# Patient Record
Sex: MALE | Race: WHITE | NOT HISPANIC OR LATINO | Employment: OTHER | URBAN - METROPOLITAN AREA
[De-identification: names, ages, dates, MRNs, and addresses within clinical notes are randomized per-mention and may not be internally consistent; named-entity substitution may affect disease eponyms.]

---

## 2020-05-20 ENCOUNTER — TELEPHONE (OUTPATIENT)
Dept: INTERNAL MEDICINE | Facility: CLINIC | Age: 65
End: 2020-05-20

## 2020-08-11 ENCOUNTER — OFFICE VISIT (OUTPATIENT)
Dept: INTERNAL MEDICINE | Facility: CLINIC | Age: 65
End: 2020-08-11
Payer: MEDICARE

## 2020-08-11 VITALS
OXYGEN SATURATION: 100 % | HEIGHT: 73 IN | BODY MASS INDEX: 23.66 KG/M2 | DIASTOLIC BLOOD PRESSURE: 80 MMHG | HEART RATE: 96 BPM | WEIGHT: 178.56 LBS | SYSTOLIC BLOOD PRESSURE: 125 MMHG

## 2020-08-11 DIAGNOSIS — E78.5 HYPERLIPIDEMIA, UNSPECIFIED HYPERLIPIDEMIA TYPE: ICD-10-CM

## 2020-08-11 DIAGNOSIS — Z00.00 ANNUAL PHYSICAL EXAM: Primary | ICD-10-CM

## 2020-08-11 PROCEDURE — 99387 INIT PM E/M NEW PAT 65+ YRS: CPT | Mod: HCNC,S$GLB,, | Performed by: INTERNAL MEDICINE

## 2020-08-11 PROCEDURE — 3079F DIAST BP 80-89 MM HG: CPT | Mod: HCNC,CPTII,S$GLB, | Performed by: INTERNAL MEDICINE

## 2020-08-11 PROCEDURE — 3079F PR MOST RECENT DIASTOLIC BLOOD PRESSURE 80-89 MM HG: ICD-10-PCS | Mod: HCNC,CPTII,S$GLB, | Performed by: INTERNAL MEDICINE

## 2020-08-11 PROCEDURE — 99999 PR PBB SHADOW E&M-EST. PATIENT-LVL III: CPT | Mod: PBBFAC,HCNC,, | Performed by: INTERNAL MEDICINE

## 2020-08-11 PROCEDURE — 99499 RISK ADDL DX/OHS AUDIT: ICD-10-PCS | Mod: S$GLB,,, | Performed by: INTERNAL MEDICINE

## 2020-08-11 PROCEDURE — 99387 PR PREVENTIVE VISIT,NEW,65 & OVER: ICD-10-PCS | Mod: HCNC,S$GLB,, | Performed by: INTERNAL MEDICINE

## 2020-08-11 PROCEDURE — 3074F PR MOST RECENT SYSTOLIC BLOOD PRESSURE < 130 MM HG: ICD-10-PCS | Mod: HCNC,CPTII,S$GLB, | Performed by: INTERNAL MEDICINE

## 2020-08-11 PROCEDURE — 99999 PR PBB SHADOW E&M-EST. PATIENT-LVL III: ICD-10-PCS | Mod: PBBFAC,HCNC,, | Performed by: INTERNAL MEDICINE

## 2020-08-11 PROCEDURE — 3074F SYST BP LT 130 MM HG: CPT | Mod: HCNC,CPTII,S$GLB, | Performed by: INTERNAL MEDICINE

## 2020-08-11 PROCEDURE — 99499 UNLISTED E&M SERVICE: CPT | Mod: S$GLB,,, | Performed by: INTERNAL MEDICINE

## 2020-08-11 RX ORDER — ATORVASTATIN CALCIUM 40 MG/1
40 TABLET, FILM COATED ORAL DAILY
COMMUNITY
End: 2020-08-11 | Stop reason: SDUPTHER

## 2020-08-11 RX ORDER — ATORVASTATIN CALCIUM 40 MG/1
40 TABLET, FILM COATED ORAL DAILY
Qty: 90 TABLET | Refills: 4 | Status: SHIPPED | OUTPATIENT
Start: 2020-08-11 | End: 2021-08-18 | Stop reason: SDUPTHER

## 2020-08-11 NOTE — PROGRESS NOTES
Subjective:       Patient ID: Fausto Aldana is a 65 y.o. male.    Chief Complaint: Establish Care and Annual Exam   This is a 65-year-old who presents today for physical to establish she reports that was previoudly in the  and continues his VA benefits he does [facility] the [facility] and he does that in conjunction with the regular physical in August he [facility] ago and he reports his checkup was okay at that time.  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  Patient reports that in general he is a healthy person he exercises 10,000 steps a day he denies any chest pain or shortness of breath.  He follows with his outlying ophthalmologist Dr. Bobo and has an upcoming appointment.   he reports [facility] which he [facility] with [facility] doctor reports it was good he had a hemoglobin A1c that was 5.1 and his cholesterol was 169 with an LDL of 104 .  He has his blood work on his phone and will try to upload a copy for his portal as well he did not wish to repeat his blood work at this time he reports his PSA was normal and has been stable over time at 2.2. He was placed on cholesterol medicine years ago to keep his cholesterol down and has been taking it regularly.  He gets his medications from [facility] and would like a refill sent.  Patient reports he is up-to-date on all his immunizations he got them previously will try to bring a copy including on Shingrix vaccine and the and Prevnar and Pneumovax.  He had a nomral colonoscopy 7 years ago at a  facility out of town and reports will be doing a [facility] with [facility] the [facility] that he got yesterday is as well    HPI  Review of Systems   Constitutional: Negative for activity change and unexpected weight change.        Exercisess regulary 10,000 steps daily    HENT: Negative for hearing loss, rhinorrhea and  "trouble swallowing.    Eyes: Negative for discharge and visual disturbance.   Respiratory: Negative for chest tightness and wheezing.    Cardiovascular: Negative for chest pain and palpitations.   Gastrointestinal: Negative for blood in stool, constipation, diarrhea and vomiting.   Endocrine: Negative for polydipsia and polyuria.   Genitourinary: Negative for difficulty urinating, hematuria and urgency.   Musculoskeletal: Negative for arthralgias, joint swelling and neck pain.   Neurological: Negative for weakness and headaches.   Psychiatric/Behavioral: Negative for confusion and dysphoric mood.       Objective:     Blood pressure 125/80, pulse 96, height 6' 1" (1.854 m), weight 81 kg (178 lb 9.2 oz), SpO2 100 %.    Physical Exam  Constitutional:       General: He is not in acute distress.  HENT:      Head: Normocephalic.   Eyes:      General: No scleral icterus.  Neck:      Musculoskeletal: Neck supple.   Cardiovascular:      Rate and Rhythm: Normal rate and regular rhythm.      Heart sounds: Normal heart sounds. No murmur. No friction rub. No gallop.    Pulmonary:      Effort: Pulmonary effort is normal. No respiratory distress.      Breath sounds: Normal breath sounds.   Abdominal:      General: Bowel sounds are normal.      Palpations: Abdomen is soft. There is no mass.      Tenderness: There is no abdominal tenderness.   Genitourinary:     Prostate: Normal.   Skin:     Findings: No erythema.      Comments: seb keratosis some moles    Neurological:      Mental Status: He is alert.         Assessment:       1. Annual physical exam    2. Hyperlipidemia, unspecified hyperlipidemia type        Plan:       Fausto was seen today for establish care and annual exam.    Diagnoses and all orders for this visit:    Annual physical exam    Hyperlipidemia, unspecified hyperlipidemia type  History of patient has been tolerating atorvastatin without difficulty recent labs acceptable and total cholesterol was 169 he will tried " upload is copy of all of his blood work when he gets home to scan into the chart.  -     atorvastatin (LIPITOR) 40 MG tablet; Take 1 tablet (40 mg total) by mouth once daily.    Patient reports he is up-to-date on all his vaccines including Shingrix and pneumonia vaccines.    Did discussed consider Dermatology appointment he has an outlying dermatologist he plans to see he also follows with Ms. gracielaying ophthalmologist and has an upcoming appointment planned.    Chol 169, hdl 47 ldl 104 trih/ h/h 16.5/ 47 is4 platelet is. 178 hgaic 5.1     Continue regular exercise  Patient reports up-to-date on his colonoscopy and has a fit kit he plans to [facility] the [facility]    Follow-up annually sooner if concerns he will upload her bring his blood work as discussed        Answers for HPI/ROS submitted by the patient on 8/8/2020   activity change: No  unexpected weight change: No  neck pain: No  hearing loss: No  rhinorrhea: No  trouble swallowing: No  eye discharge: No  visual disturbance: No  chest tightness: No  wheezing: No  chest pain: No  palpitations: No  blood in stool: No  constipation: No  vomiting: No  diarrhea: No  polydipsia: No  polyuria: No  difficulty urinating: No  urgency: No  hematuria: No  joint swelling: No  arthralgias: No  headaches: No  weakness: No  confusion: No  dysphoric mood: No

## 2020-09-01 ENCOUNTER — TELEPHONE (OUTPATIENT)
Dept: INTERNAL MEDICINE | Facility: CLINIC | Age: 65
End: 2020-09-01

## 2020-09-01 RX ORDER — SILDENAFIL 100 MG/1
TABLET, FILM COATED ORAL
Qty: 30 TABLET | Refills: 3 | Status: SHIPPED | OUTPATIENT
Start: 2020-09-01 | End: 2021-08-18 | Stop reason: SDUPTHER

## 2020-09-01 NOTE — TELEPHONE ENCOUNTER
Called and spoke with pt  Request rx for generic viagra  [facility]  But needs sent to local pharmacy  Request sent to ankur guzman  rx sent as requested

## 2020-09-01 NOTE — TELEPHONE ENCOUNTER
----- Message from Lili Felder sent at 9/1/2020  4:07 PM CDT -----  Contact: Pt [phone]--  Rx Refill/Request     Is this a New Rx:--Yes--    Rx Name and Strength:    1.Sildenafil     Preferred Pharm[facility] with phone number:--[phone]  [phone] [id number]    Communication Preference:--Fausto--[phone]--    Additional Information: Pt calling to speak with the nurse to see if the doctors would be able to send the medication listed above. Please call to advise.

## 2021-01-24 ENCOUNTER — PATIENT MESSAGE (OUTPATIENT)
Dept: INTERNAL MEDICINE | Facility: CLINIC | Age: 66
End: 2021-01-24

## 2021-01-27 ENCOUNTER — PATIENT MESSAGE (OUTPATIENT)
Dept: INTERNAL MEDICINE | Facility: CLINIC | Age: 66
End: 2021-01-27

## 2021-01-27 RX ORDER — ONDANSETRON 4 MG/1
4 TABLET, ORALLY DISINTEGRATING ORAL EVERY 8 HOURS PRN
Qty: 10 TABLET | Refills: 0 | Status: SHIPPED | OUTPATIENT
Start: 2021-01-27 | End: 2021-08-18

## 2021-03-07 ENCOUNTER — HOSPITAL ENCOUNTER (EMERGENCY)
Facility: HOSPITAL | Age: 66
Discharge: HOME | End: 2021-03-07
Attending: EMERGENCY MEDICINE
Payer: MEDICARE

## 2021-03-07 VITALS
TEMPERATURE: 98 F | WEIGHT: 181 LBS | RESPIRATION RATE: 18 BRPM | DIASTOLIC BLOOD PRESSURE: 79 MMHG | BODY MASS INDEX: 23.88 KG/M2 | HEART RATE: 88 BPM | OXYGEN SATURATION: 98 % | SYSTOLIC BLOOD PRESSURE: 144 MMHG

## 2021-03-07 DIAGNOSIS — G56.00 MEDIAN NERVE COMPRESSION: Primary | ICD-10-CM

## 2021-03-07 PROCEDURE — 99282 PR EMERGENCY DEPT VISIT,LEVEL II: ICD-10-PCS | Mod: ,,, | Performed by: EMERGENCY MEDICINE

## 2021-03-07 PROCEDURE — 99281 EMR DPT VST MAYX REQ PHY/QHP: CPT

## 2021-03-07 PROCEDURE — 99282 EMERGENCY DEPT VISIT SF MDM: CPT | Mod: ,,, | Performed by: EMERGENCY MEDICINE

## 2021-07-12 ENCOUNTER — PATIENT MESSAGE (OUTPATIENT)
Dept: INTERNAL MEDICINE | Facility: CLINIC | Age: 66
End: 2021-07-12

## 2021-08-18 ENCOUNTER — OFFICE VISIT (OUTPATIENT)
Dept: INTERNAL MEDICINE | Facility: CLINIC | Age: 66
End: 2021-08-18
Payer: MEDICARE

## 2021-08-18 ENCOUNTER — PATIENT MESSAGE (OUTPATIENT)
Dept: INTERNAL MEDICINE | Facility: CLINIC | Age: 66
End: 2021-08-18

## 2021-08-18 VITALS
DIASTOLIC BLOOD PRESSURE: 72 MMHG | HEART RATE: 81 BPM | HEIGHT: 73 IN | BODY MASS INDEX: 22.12 KG/M2 | OXYGEN SATURATION: 97 % | SYSTOLIC BLOOD PRESSURE: 108 MMHG | WEIGHT: 166.88 LBS

## 2021-08-18 DIAGNOSIS — E78.5 HYPERLIPIDEMIA, UNSPECIFIED HYPERLIPIDEMIA TYPE: ICD-10-CM

## 2021-08-18 DIAGNOSIS — Z00.00 ANNUAL PHYSICAL EXAM: Primary | ICD-10-CM

## 2021-08-18 PROCEDURE — 3074F SYST BP LT 130 MM HG: CPT | Mod: CPTII,S$GLB,, | Performed by: INTERNAL MEDICINE

## 2021-08-18 PROCEDURE — 3288F PR FALLS RISK ASSESSMENT DOCUMENTED: ICD-10-PCS | Mod: CPTII,S$GLB,, | Performed by: INTERNAL MEDICINE

## 2021-08-18 PROCEDURE — 99499 RISK ADDL DX/OHS AUDIT: ICD-10-PCS | Mod: S$GLB,,, | Performed by: INTERNAL MEDICINE

## 2021-08-18 PROCEDURE — 1159F PR MEDICATION LIST DOCUMENTED IN MEDICAL RECORD: ICD-10-PCS | Mod: CPTII,S$GLB,, | Performed by: INTERNAL MEDICINE

## 2021-08-18 PROCEDURE — 99397 PER PM REEVAL EST PAT 65+ YR: CPT | Mod: S$GLB,,, | Performed by: INTERNAL MEDICINE

## 2021-08-18 PROCEDURE — 99999 PR PBB SHADOW E&M-EST. PATIENT-LVL III: ICD-10-PCS | Mod: PBBFAC,,, | Performed by: INTERNAL MEDICINE

## 2021-08-18 PROCEDURE — 99999 PR PBB SHADOW E&M-EST. PATIENT-LVL III: CPT | Mod: PBBFAC,,, | Performed by: INTERNAL MEDICINE

## 2021-08-18 PROCEDURE — 1159F MED LIST DOCD IN RCRD: CPT | Mod: CPTII,S$GLB,, | Performed by: INTERNAL MEDICINE

## 2021-08-18 PROCEDURE — 1101F PT FALLS ASSESS-DOCD LE1/YR: CPT | Mod: CPTII,S$GLB,, | Performed by: INTERNAL MEDICINE

## 2021-08-18 PROCEDURE — 99397 PR PREVENTIVE VISIT,EST,65 & OVER: ICD-10-PCS | Mod: S$GLB,,, | Performed by: INTERNAL MEDICINE

## 2021-08-18 PROCEDURE — 3074F PR MOST RECENT SYSTOLIC BLOOD PRESSURE < 130 MM HG: ICD-10-PCS | Mod: CPTII,S$GLB,, | Performed by: INTERNAL MEDICINE

## 2021-08-18 PROCEDURE — 3008F PR BODY MASS INDEX (BMI) DOCUMENTED: ICD-10-PCS | Mod: CPTII,S$GLB,, | Performed by: INTERNAL MEDICINE

## 2021-08-18 PROCEDURE — 99499 UNLISTED E&M SERVICE: CPT | Mod: S$GLB,,, | Performed by: INTERNAL MEDICINE

## 2021-08-18 PROCEDURE — 3008F BODY MASS INDEX DOCD: CPT | Mod: CPTII,S$GLB,, | Performed by: INTERNAL MEDICINE

## 2021-08-18 PROCEDURE — 3078F PR MOST RECENT DIASTOLIC BLOOD PRESSURE < 80 MM HG: ICD-10-PCS | Mod: CPTII,S$GLB,, | Performed by: INTERNAL MEDICINE

## 2021-08-18 PROCEDURE — 3288F FALL RISK ASSESSMENT DOCD: CPT | Mod: CPTII,S$GLB,, | Performed by: INTERNAL MEDICINE

## 2021-08-18 PROCEDURE — 1126F AMNT PAIN NOTED NONE PRSNT: CPT | Mod: CPTII,S$GLB,, | Performed by: INTERNAL MEDICINE

## 2021-08-18 PROCEDURE — 3078F DIAST BP <80 MM HG: CPT | Mod: CPTII,S$GLB,, | Performed by: INTERNAL MEDICINE

## 2021-08-18 PROCEDURE — 1160F PR REVIEW ALL MEDS BY PRESCRIBER/CLIN PHARMACIST DOCUMENTED: ICD-10-PCS | Mod: CPTII,S$GLB,, | Performed by: INTERNAL MEDICINE

## 2021-08-18 PROCEDURE — 1160F RVW MEDS BY RX/DR IN RCRD: CPT | Mod: CPTII,S$GLB,, | Performed by: INTERNAL MEDICINE

## 2021-08-18 PROCEDURE — 1126F PR PAIN SEVERITY QUANTIFIED, NO PAIN PRESENT: ICD-10-PCS | Mod: CPTII,S$GLB,, | Performed by: INTERNAL MEDICINE

## 2021-08-18 PROCEDURE — 1101F PR PT FALLS ASSESS DOC 0-1 FALLS W/OUT INJ PAST YR: ICD-10-PCS | Mod: CPTII,S$GLB,, | Performed by: INTERNAL MEDICINE

## 2021-08-18 RX ORDER — ATORVASTATIN CALCIUM 40 MG/1
40 TABLET, FILM COATED ORAL DAILY
Qty: 90 TABLET | Refills: 4 | Status: SHIPPED | OUTPATIENT
Start: 2021-08-18 | End: 2022-09-13 | Stop reason: SDUPTHER

## 2021-08-18 RX ORDER — SILDENAFIL 100 MG/1
TABLET, FILM COATED ORAL
Qty: 30 TABLET | Refills: 3 | Status: SHIPPED | OUTPATIENT
Start: 2021-08-18

## 2021-10-26 ENCOUNTER — IMMUNIZATION (OUTPATIENT)
Dept: INTERNAL MEDICINE | Facility: CLINIC | Age: 66
End: 2021-10-26
Payer: MEDICARE

## 2021-10-26 DIAGNOSIS — Z23 NEED FOR VACCINATION: Primary | ICD-10-CM

## 2021-10-26 PROCEDURE — 91300 COVID-19, MRNA, LNP-S, PF, 30 MCG/0.3 ML DOSE VACCINE: CPT | Mod: HCNC,PBBFAC | Performed by: INTERNAL MEDICINE

## 2021-10-26 PROCEDURE — 0003A COVID-19, MRNA, LNP-S, PF, 30 MCG/0.3 ML DOSE VACCINE: CPT | Mod: HCNC,CV19,PBBFAC | Performed by: INTERNAL MEDICINE

## 2022-04-13 ENCOUNTER — PES CALL (OUTPATIENT)
Dept: ADMINISTRATIVE | Facility: CLINIC | Age: 67
End: 2022-04-13
Payer: MEDICARE

## 2022-06-06 ENCOUNTER — PATIENT MESSAGE (OUTPATIENT)
Dept: INTERNAL MEDICINE | Facility: CLINIC | Age: 67
End: 2022-06-06
Payer: MEDICARE

## 2022-09-13 ENCOUNTER — OFFICE VISIT (OUTPATIENT)
Dept: INTERNAL MEDICINE | Facility: CLINIC | Age: 67
End: 2022-09-13
Payer: MEDICARE

## 2022-09-13 ENCOUNTER — LAB VISIT (OUTPATIENT)
Dept: LAB | Facility: HOSPITAL | Age: 67
End: 2022-09-13
Attending: INTERNAL MEDICINE
Payer: MEDICARE

## 2022-09-13 VITALS
DIASTOLIC BLOOD PRESSURE: 70 MMHG | OXYGEN SATURATION: 98 % | SYSTOLIC BLOOD PRESSURE: 118 MMHG | WEIGHT: 172.38 LBS | HEIGHT: 73 IN | HEART RATE: 63 BPM | BODY MASS INDEX: 22.85 KG/M2

## 2022-09-13 DIAGNOSIS — N40.0 BENIGN PROSTATIC HYPERPLASIA, UNSPECIFIED WHETHER LOWER URINARY TRACT SYMPTOMS PRESENT: ICD-10-CM

## 2022-09-13 DIAGNOSIS — E78.5 HYPERLIPIDEMIA, UNSPECIFIED HYPERLIPIDEMIA TYPE: ICD-10-CM

## 2022-09-13 DIAGNOSIS — Z00.00 ANNUAL PHYSICAL EXAM: Primary | ICD-10-CM

## 2022-09-13 LAB
BILIRUB UR QL STRIP: NEGATIVE
CLARITY UR REFRACT.AUTO: CLEAR
COLOR UR AUTO: YELLOW
GLUCOSE UR QL STRIP: NEGATIVE
HGB UR QL STRIP: NEGATIVE
KETONES UR QL STRIP: ABNORMAL
LEUKOCYTE ESTERASE UR QL STRIP: NEGATIVE
NITRITE UR QL STRIP: NEGATIVE
PH UR STRIP: 6 [PH] (ref 5–8)
PROT UR QL STRIP: NEGATIVE
SP GR UR STRIP: 1.03 (ref 1–1.03)
URN SPEC COLLECT METH UR: ABNORMAL

## 2022-09-13 PROCEDURE — 3008F BODY MASS INDEX DOCD: CPT | Mod: CPTII,S$GLB,, | Performed by: INTERNAL MEDICINE

## 2022-09-13 PROCEDURE — 1160F RVW MEDS BY RX/DR IN RCRD: CPT | Mod: CPTII,S$GLB,, | Performed by: INTERNAL MEDICINE

## 2022-09-13 PROCEDURE — 3288F PR FALLS RISK ASSESSMENT DOCUMENTED: ICD-10-PCS | Mod: CPTII,S$GLB,, | Performed by: INTERNAL MEDICINE

## 2022-09-13 PROCEDURE — 1159F MED LIST DOCD IN RCRD: CPT | Mod: CPTII,S$GLB,, | Performed by: INTERNAL MEDICINE

## 2022-09-13 PROCEDURE — 3008F PR BODY MASS INDEX (BMI) DOCUMENTED: ICD-10-PCS | Mod: CPTII,S$GLB,, | Performed by: INTERNAL MEDICINE

## 2022-09-13 PROCEDURE — 3288F FALL RISK ASSESSMENT DOCD: CPT | Mod: CPTII,S$GLB,, | Performed by: INTERNAL MEDICINE

## 2022-09-13 PROCEDURE — 1101F PR PT FALLS ASSESS DOC 0-1 FALLS W/OUT INJ PAST YR: ICD-10-PCS | Mod: CPTII,S$GLB,, | Performed by: INTERNAL MEDICINE

## 2022-09-13 PROCEDURE — 3078F DIAST BP <80 MM HG: CPT | Mod: CPTII,S$GLB,, | Performed by: INTERNAL MEDICINE

## 2022-09-13 PROCEDURE — 1126F AMNT PAIN NOTED NONE PRSNT: CPT | Mod: CPTII,S$GLB,, | Performed by: INTERNAL MEDICINE

## 2022-09-13 PROCEDURE — 1159F PR MEDICATION LIST DOCUMENTED IN MEDICAL RECORD: ICD-10-PCS | Mod: CPTII,S$GLB,, | Performed by: INTERNAL MEDICINE

## 2022-09-13 PROCEDURE — 81003 URINALYSIS AUTO W/O SCOPE: CPT | Performed by: INTERNAL MEDICINE

## 2022-09-13 PROCEDURE — 3078F PR MOST RECENT DIASTOLIC BLOOD PRESSURE < 80 MM HG: ICD-10-PCS | Mod: CPTII,S$GLB,, | Performed by: INTERNAL MEDICINE

## 2022-09-13 PROCEDURE — 99999 PR PBB SHADOW E&M-EST. PATIENT-LVL III: ICD-10-PCS | Mod: PBBFAC,,, | Performed by: INTERNAL MEDICINE

## 2022-09-13 PROCEDURE — 3074F PR MOST RECENT SYSTOLIC BLOOD PRESSURE < 130 MM HG: ICD-10-PCS | Mod: CPTII,S$GLB,, | Performed by: INTERNAL MEDICINE

## 2022-09-13 PROCEDURE — 99397 PR PREVENTIVE VISIT,EST,65 & OVER: ICD-10-PCS | Mod: S$GLB,,, | Performed by: INTERNAL MEDICINE

## 2022-09-13 PROCEDURE — 1101F PT FALLS ASSESS-DOCD LE1/YR: CPT | Mod: CPTII,S$GLB,, | Performed by: INTERNAL MEDICINE

## 2022-09-13 PROCEDURE — 1160F PR REVIEW ALL MEDS BY PRESCRIBER/CLIN PHARMACIST DOCUMENTED: ICD-10-PCS | Mod: CPTII,S$GLB,, | Performed by: INTERNAL MEDICINE

## 2022-09-13 PROCEDURE — 1126F PR PAIN SEVERITY QUANTIFIED, NO PAIN PRESENT: ICD-10-PCS | Mod: CPTII,S$GLB,, | Performed by: INTERNAL MEDICINE

## 2022-09-13 PROCEDURE — 99999 PR PBB SHADOW E&M-EST. PATIENT-LVL III: CPT | Mod: PBBFAC,,, | Performed by: INTERNAL MEDICINE

## 2022-09-13 PROCEDURE — 99397 PER PM REEVAL EST PAT 65+ YR: CPT | Mod: S$GLB,,, | Performed by: INTERNAL MEDICINE

## 2022-09-13 PROCEDURE — 3074F SYST BP LT 130 MM HG: CPT | Mod: CPTII,S$GLB,, | Performed by: INTERNAL MEDICINE

## 2022-09-13 RX ORDER — TAMSULOSIN HYDROCHLORIDE 0.4 MG/1
0.4 CAPSULE ORAL DAILY
Qty: 90 CAPSULE | Refills: 3 | Status: SHIPPED | OUTPATIENT
Start: 2022-09-13 | End: 2023-09-19 | Stop reason: SDUPTHER

## 2022-09-13 RX ORDER — ATORVASTATIN CALCIUM 40 MG/1
40 TABLET, FILM COATED ORAL DAILY
Qty: 90 TABLET | Refills: 4 | Status: SHIPPED | OUTPATIENT
Start: 2022-09-13 | End: 2023-09-19 | Stop reason: SDUPTHER

## 2022-09-13 NOTE — PROGRESS NOTES
"  Subjective:       Patient ID: Fausto Aldana is a 67 y.o. male.    Chief Complaint: Annual Exam  67 year old presents for annual. [facility] and visits had recent bloodwork which he sent and brings the rest on his phone  Labs looked good including psa which was normal. He reports has been busy working as  at [employer] and also   At the [employer] which he enjoys. He enjoys being around people. He has been feeling well overall exericising regularly 5 x week goes to the gym and   Walks or runs a few miles daily as well. He did loose his girlfriend recently. He reports since then he has had some hesitancy with his urinary stream on occasion that improves after ejaculation. He denies and pain or fever . He otherwise has been well.    HPI  Review of Systems   Constitutional:  Negative for chills, fever and unexpected weight change.   Respiratory:  Negative for chest tightness and shortness of breath.    Cardiovascular:  Negative for chest pain and palpitations.   Gastrointestinal:  Negative for abdominal pain, nausea and vomiting.   Neurological:  Negative for weakness.     Objective:    Blood pressure 118/70, pulse 63, height 6' 1" (1.854 m), weight 78.2 kg (172 lb 6.4 oz), SpO2 98 %.   Physical Exam  Constitutional:       General: He is not in acute distress.  HENT:      Head: Normocephalic.      Mouth/Throat:      Pharynx: Oropharynx is clear.   Eyes:      General: No scleral icterus.  Cardiovascular:      Rate and Rhythm: Normal rate and regular rhythm.      Heart sounds: Normal heart sounds. No murmur heard.    No friction rub. No gallop.   Pulmonary:      Effort: Pulmonary effort is normal. No respiratory distress.      Breath sounds: Normal breath sounds.   Abdominal:      General: Bowel sounds are normal.      Palpations: Abdomen is soft. There is no mass.      Tenderness: There is no abdominal tenderness.   Genitourinary:     Comments: Non tedner mild enlargement "   Musculoskeletal:      Cervical back: Neck supple.   Skin:     Findings: No erythema.   Neurological:      Mental Status: He is alert.   Psychiatric:         Mood and Affect: Mood normal.       Assessment:       1. Annual physical exam    2. Hyperlipidemia, unspecified hyperlipidemia type    3. Benign prostatic hyperplasia, unspecified whether lower urinary tract symptoms present          Plan:       Fausto was seen today for annual exam.    Diagnoses and all orders for this visit:    Annual physical exam    Hyperlipidemia, unspecified hyperlipidemia type  Controlled recent lipids reviewed   Discussed baseline ekg he declined at this time will call if he would like to do so  -     atorvastatin (LIPITOR) 40 MG tablet; Take 1 tablet (40 mg total) by mouth once daily.    Benign prostatic hyperplasia, unspecified whether lower urinary tract symptoms present  Discussed with pt   Discussed urology consultation he declined at this time will call if he would like to do so  [facility]   -     Urinalysis, Reflex to Urine Culture Urine, Clean Catch; Future    Discussed trial of risk benefits and not to take with viagra   -     tamsulosin (FLOMAX) 0.4 mg Cap; Take 1 capsule (0.4 mg total) by mouth once daily.  He will let me know if no impromvnet        Discussed covid booster and flu shot he plans to get   Follow up annually sooner if concern

## 2022-09-14 ENCOUNTER — PATIENT MESSAGE (OUTPATIENT)
Dept: INTERNAL MEDICINE | Facility: CLINIC | Age: 67
End: 2022-09-14
Payer: MEDICARE

## 2022-10-11 ENCOUNTER — PATIENT MESSAGE (OUTPATIENT)
Dept: INTERNAL MEDICINE | Facility: CLINIC | Age: 67
End: 2022-10-11
Payer: MEDICARE

## 2022-12-20 ENCOUNTER — PES CALL (OUTPATIENT)
Dept: ADMINISTRATIVE | Facility: OTHER | Age: 67
End: 2022-12-20
Payer: MEDICARE

## 2023-02-07 DIAGNOSIS — Z00.00 ENCOUNTER FOR MEDICARE ANNUAL WELLNESS EXAM: ICD-10-CM

## 2023-02-09 DIAGNOSIS — Z00.00 ENCOUNTER FOR MEDICARE ANNUAL WELLNESS EXAM: ICD-10-CM

## 2023-04-07 ENCOUNTER — PATIENT MESSAGE (OUTPATIENT)
Dept: INTERNAL MEDICINE | Facility: CLINIC | Age: 68
End: 2023-04-07
Payer: MEDICARE

## 2023-04-08 ENCOUNTER — PATIENT MESSAGE (OUTPATIENT)
Dept: INTERNAL MEDICINE | Facility: CLINIC | Age: 68
End: 2023-04-08
Payer: MEDICARE

## 2023-04-21 ENCOUNTER — PATIENT MESSAGE (OUTPATIENT)
Dept: ADMINISTRATIVE | Facility: HOSPITAL | Age: 68
End: 2023-04-21
Payer: MEDICARE

## 2023-05-12 ENCOUNTER — PATIENT OUTREACH (OUTPATIENT)
Dept: ADMINISTRATIVE | Facility: HOSPITAL | Age: 68
End: 2023-05-12
Payer: MEDICARE

## 2023-05-12 ENCOUNTER — PATIENT MESSAGE (OUTPATIENT)
Dept: INTERNAL MEDICINE | Facility: CLINIC | Age: 68
End: 2023-05-12
Payer: MEDICARE

## 2023-06-12 ENCOUNTER — PES CALL (OUTPATIENT)
Dept: ADMINISTRATIVE | Facility: CLINIC | Age: 68
End: 2023-06-12
Payer: MEDICARE

## 2023-06-20 ENCOUNTER — PES CALL (OUTPATIENT)
Dept: ADMINISTRATIVE | Facility: CLINIC | Age: 68
End: 2023-06-20
Payer: MEDICARE

## 2023-09-19 ENCOUNTER — OFFICE VISIT (OUTPATIENT)
Dept: INTERNAL MEDICINE | Facility: CLINIC | Age: 68
End: 2023-09-19
Payer: MEDICARE

## 2023-09-19 ENCOUNTER — PATIENT MESSAGE (OUTPATIENT)
Dept: INTERNAL MEDICINE | Facility: CLINIC | Age: 68
End: 2023-09-19

## 2023-09-19 VITALS
HEART RATE: 87 BPM | SYSTOLIC BLOOD PRESSURE: 122 MMHG | DIASTOLIC BLOOD PRESSURE: 82 MMHG | HEIGHT: 73 IN | BODY MASS INDEX: 23.49 KG/M2 | WEIGHT: 177.25 LBS | OXYGEN SATURATION: 99 %

## 2023-09-19 DIAGNOSIS — Z00.00 ANNUAL PHYSICAL EXAM: Primary | ICD-10-CM

## 2023-09-19 DIAGNOSIS — N40.0 BENIGN PROSTATIC HYPERPLASIA, UNSPECIFIED WHETHER LOWER URINARY TRACT SYMPTOMS PRESENT: ICD-10-CM

## 2023-09-19 DIAGNOSIS — L98.9 SKIN LESION: ICD-10-CM

## 2023-09-19 DIAGNOSIS — M25.561 RIGHT KNEE PAIN, UNSPECIFIED CHRONICITY: ICD-10-CM

## 2023-09-19 DIAGNOSIS — E78.5 HYPERLIPIDEMIA, UNSPECIFIED HYPERLIPIDEMIA TYPE: ICD-10-CM

## 2023-09-19 PROCEDURE — 99397 PER PM REEVAL EST PAT 65+ YR: CPT | Mod: HCNC,S$GLB,, | Performed by: INTERNAL MEDICINE

## 2023-09-19 PROCEDURE — 3079F DIAST BP 80-89 MM HG: CPT | Mod: HCNC,CPTII,S$GLB, | Performed by: INTERNAL MEDICINE

## 2023-09-19 PROCEDURE — 99999 PR PBB SHADOW E&M-EST. PATIENT-LVL III: CPT | Mod: PBBFAC,HCNC,, | Performed by: INTERNAL MEDICINE

## 2023-09-19 PROCEDURE — 99999 PR PBB SHADOW E&M-EST. PATIENT-LVL III: ICD-10-PCS | Mod: PBBFAC,HCNC,, | Performed by: INTERNAL MEDICINE

## 2023-09-19 PROCEDURE — 1101F PT FALLS ASSESS-DOCD LE1/YR: CPT | Mod: HCNC,CPTII,S$GLB, | Performed by: INTERNAL MEDICINE

## 2023-09-19 PROCEDURE — 1160F PR REVIEW ALL MEDS BY PRESCRIBER/CLIN PHARMACIST DOCUMENTED: ICD-10-PCS | Mod: HCNC,CPTII,S$GLB, | Performed by: INTERNAL MEDICINE

## 2023-09-19 PROCEDURE — 3008F BODY MASS INDEX DOCD: CPT | Mod: HCNC,CPTII,S$GLB, | Performed by: INTERNAL MEDICINE

## 2023-09-19 PROCEDURE — 3288F FALL RISK ASSESSMENT DOCD: CPT | Mod: HCNC,CPTII,S$GLB, | Performed by: INTERNAL MEDICINE

## 2023-09-19 PROCEDURE — 1101F PR PT FALLS ASSESS DOC 0-1 FALLS W/OUT INJ PAST YR: ICD-10-PCS | Mod: HCNC,CPTII,S$GLB, | Performed by: INTERNAL MEDICINE

## 2023-09-19 PROCEDURE — 3074F SYST BP LT 130 MM HG: CPT | Mod: HCNC,CPTII,S$GLB, | Performed by: INTERNAL MEDICINE

## 2023-09-19 PROCEDURE — 99397 PR PREVENTIVE VISIT,EST,65 & OVER: ICD-10-PCS | Mod: HCNC,S$GLB,, | Performed by: INTERNAL MEDICINE

## 2023-09-19 PROCEDURE — 3008F PR BODY MASS INDEX (BMI) DOCUMENTED: ICD-10-PCS | Mod: HCNC,CPTII,S$GLB, | Performed by: INTERNAL MEDICINE

## 2023-09-19 PROCEDURE — 3288F PR FALLS RISK ASSESSMENT DOCUMENTED: ICD-10-PCS | Mod: HCNC,CPTII,S$GLB, | Performed by: INTERNAL MEDICINE

## 2023-09-19 PROCEDURE — 3074F PR MOST RECENT SYSTOLIC BLOOD PRESSURE < 130 MM HG: ICD-10-PCS | Mod: HCNC,CPTII,S$GLB, | Performed by: INTERNAL MEDICINE

## 2023-09-19 PROCEDURE — 1159F MED LIST DOCD IN RCRD: CPT | Mod: HCNC,CPTII,S$GLB, | Performed by: INTERNAL MEDICINE

## 2023-09-19 PROCEDURE — 3079F PR MOST RECENT DIASTOLIC BLOOD PRESSURE 80-89 MM HG: ICD-10-PCS | Mod: HCNC,CPTII,S$GLB, | Performed by: INTERNAL MEDICINE

## 2023-09-19 PROCEDURE — 1160F RVW MEDS BY RX/DR IN RCRD: CPT | Mod: HCNC,CPTII,S$GLB, | Performed by: INTERNAL MEDICINE

## 2023-09-19 PROCEDURE — 1159F PR MEDICATION LIST DOCUMENTED IN MEDICAL RECORD: ICD-10-PCS | Mod: HCNC,CPTII,S$GLB, | Performed by: INTERNAL MEDICINE

## 2023-09-19 RX ORDER — TAMSULOSIN HYDROCHLORIDE 0.4 MG/1
0.4 CAPSULE ORAL DAILY
Qty: 90 CAPSULE | Refills: 3 | Status: SHIPPED | OUTPATIENT
Start: 2023-09-19 | End: 2024-09-18

## 2023-09-19 RX ORDER — ATORVASTATIN CALCIUM 40 MG/1
40 TABLET, FILM COATED ORAL DAILY
Qty: 90 TABLET | Refills: 4 | Status: SHIPPED | OUTPATIENT
Start: 2023-09-19

## 2023-09-19 NOTE — PROGRESS NOTES
"Subjective:       Patient ID: Fausto Aldana is a 68 y.o. male.    Chief Complaint: Annual Exam  This is a 68-year-old who presents today for follow-up he continues to stay active is been walking regularly for exercise now several times a week he goes to [employer].  He continues to work as  on Tuesdays and sometimes on the weekends which she enjoys.  [facility] and eyes checked.  He would like to see a dermatologist for skin lesions.  Flomax seems to be helping with his urinary stream he reports he had some blood [facility] in [facility] he did upload those readings he thought they did a PSA but he will check for that as well.  He did have a fit kit which was negative.  He plans to get his flu shot and through [employer] for work .  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     HPI  Review of Systems   Respiratory:  Negative for shortness of breath.    Cardiovascular:  Negative for chest pain.   Genitourinary:         Flomax helping urinary stream    Musculoskeletal:         Right knee pain at times    Skin:         Ski lesions       Objective:    Blood pressure 122/82, pulse 87, height 6' 1" (1.854 m), weight 80.4 kg (177 lb 4 oz), SpO2 99 %.   Physical Exam  Constitutional:       General: He is not in acute distress.  HENT:      Head: Normocephalic.      Mouth/Throat:      Pharynx: Oropharynx is clear.   Eyes:      General: No scleral icterus.  Cardiovascular:      Rate and Rhythm: Normal rate and regular rhythm.      Heart sounds: Normal heart sounds. No " murmur heard.     No friction rub. No gallop.   Pulmonary:      Effort: Pulmonary effort is normal. No respiratory distress.      Breath sounds: Normal breath sounds.   Abdominal:      General: Bowel sounds are normal.      Palpations: Abdomen is soft. There is no mass.      Tenderness: There is no abdominal tenderness.   Musculoskeletal:      Cervical back: Neck supple.   Skin:     Findings: No erythema.      Comments: Skin lesion neck  Hyprppigmented mole back    Neurological:      Mental Status: He is alert.   Psychiatric:         Mood and Affect: Mood normal.         Assessment:       1. Annual physical exam    2. Hyperlipidemia, unspecified hyperlipidemia type    3. Benign prostatic hyperplasia, unspecified whether lower urinary tract symptoms present    4. Right knee pain, unspecified chronicity    5. Skin lesion        Plan:       Fausto was seen today for annual exam.    Diagnoses and all orders for this visit:    Annual physical exam    Hyperlipidemia, unspecified hyperlipidemia type  Continues to tolerate atorvastatin  -     atorvastatin (LIPITOR) 40 MG tablet; Take 1 tablet (40 mg total) by mouth once daily.    Benign prostatic hyperplasia, unspecified whether lower urinary tract symptoms present  Discussed urine flow has been good he remains on Flomax refill sent    Right knee pain, unspecified chronicity  Discussed intermittent with prolonged bending continue conservative measures but he will call if he would like an orthopedic consult    Skin lesion  Discussed referral placed for scheduling evaluation of skin lesions and mole on his back  -     Ambulatory referral/consult to Dermatology; Future    Other orders  -     tamsulosin (FLOMAX) 0.4 mg Cap; Take 1 capsule (0.4 mg total) by mouth once daily.    Recent labs reviewed continued healthy dietary measures and exercise call if concerns    He reports he did get his shingles vaccine and plans to get his flu shot through [facility]

## 2023-11-05 ENCOUNTER — E-VISIT (OUTPATIENT)
Dept: INTERNAL MEDICINE | Facility: CLINIC | Age: 68
End: 2023-11-05
Payer: MEDICARE

## 2023-11-05 DIAGNOSIS — J32.9 SINUSITIS, UNSPECIFIED CHRONICITY, UNSPECIFIED LOCATION: Primary | ICD-10-CM

## 2023-11-05 PROCEDURE — 99422 PR E&M, ONLINE DIGIT, EST, < 7 DAYS,  11-20 MINS: ICD-10-PCS | Mod: ,,, | Performed by: INTERNAL MEDICINE

## 2023-11-05 PROCEDURE — 99422 OL DIG E/M SVC 11-20 MIN: CPT | Mod: ,,, | Performed by: INTERNAL MEDICINE

## 2023-11-06 RX ORDER — AMOXICILLIN 500 MG/1
500 TABLET, FILM COATED ORAL 2 TIMES DAILY
Qty: 20 TABLET | Refills: 0 | Status: SHIPPED | OUTPATIENT
Start: 2023-11-06 | End: 2023-11-16

## 2023-11-06 NOTE — PROGRESS NOTES
Patient ID: Fausto Aldana is a 68 y.o. male.    Chief Complaint: URI (Entered automatically based on patient selection in Patient Portal.)    The patient initiated a request through [facility] on 11/5/2023 for evaluation and management with a chief complaint of URI (Entered automatically based on patient selection in Patient Portal.)     I evaluated the questionnaire submission on 11/5/2023 .    This is a 68-year-old who presents today with upper respiratory symptoms he reports he started with nasal congestion and sinus pressure recently it seems to be worsening he tends to get sinus infection but has been several years since he had 1 he reports usually responds to amoxicillin denies shortness of breath or cough has a lot of facial pressure and postnasal drip and congestion.  He denies body aches or high fevers.    [facility] Peq Evisit Upper Respitatory/Cough Questionnaire    11/5/2023  2:42 AM CST - Filed by Patient   Do you agree to participate in an E-Visit? Yes   If you have any of the following symptoms, please present to your local ER or call 911:  I acknowledge   What is the main issue that you would like for your doctor to address today? Woke up with sinus infect. 99 temp, spitting up mucus,ect. Had this in 2016 and was prescribed amoxicillin 500mg, took Claritin and Tylenol PRN for temp better in days. I used [address]ori [address]   Are you able to take your vital signs? Yes   Systolic Blood Pressure: 130   Diastolic Blood Pressure: 60   Weight: 175   Height: 72   Pulse: 90   Temperature: 99   Respiration rate:    Pulse Oxygen:    What symptoms do you currently have?  Nasal Congestion   Describe the mucus: Green   Have you ever smoked? I have never smoked   Have you had a fever? Yes   What has been the range of your fever? Less than 100.4   When did your symptoms first appear? 11/5/2023   In the last two weeks, have you been in close contact with someone who has COVID-19 or the Flu? No   In the last two weeks,  have you worked or volunteered in a healthcare facility or as a ? Healthcare facilities include a hospital, medical or dental clinic, long-term care facility, or nursing home Yes   Do you live in a long-term care facility, nursing home, group home, or homeless shelter? No   List what you have done or taken to help your symptoms. Tylenol. I typically get 500 mg Amoxicillin from [facility]. Lastbtime i had this 2016. I have had flu shot from [facility]   How severe are your symptoms? Severe   Have your symptoms improved since they first appeared? No change   Have you taken an at home Covid test? No   Have you taken a Flu test? No   Have you been fully vaccinated for COVID? (2 Pfizer, 2 Moderna or 1 Gee & Gee vaccine injections) Yes   Have you received a booster? Yes   Have you recieved a Flu shot? Yes   When did you recieve your Flu shot? 10/3/2023   Do you have transportation to get tested for COVID if it is indicated and ordered for you at an [facility] location? Yes   Provide any information you feel is important to your history not asked above This chart robel does show katina reved the flu shot and was done at [facility] in addition says outstanding for my annual physical and that was done in September.   Please attach any relevant images or files          Recent Labs Obtained:  No visits with results within 7 Day(s) from this visit.   Latest known visit with results is:   Lab Visit on 09/13/2022   Component Date Value Ref Range Status    Specimen UA 09/13/2022 Urine, Clean Catch   Final    Color, UA 09/13/2022 Yellow  Yellow, Straw, Casandra Final    Appearance, UA 09/13/2022 Clear  Clear Final    pH, UA 09/13/2022 6.0  5.0 - 8.0 Final    Specific Gravity, UA 09/13/2022 1.030  1.005 - 1.030 Final    Protein, UA 09/13/2022 Negative  Negative Final    Comment: Recommend a 24 hour urine protein or a urine   protein/creatinine ratio if globulin induced proteinuria is  clinically  suspected.      Glucose, UA 2022 Negative  Negative Final    Ketones, UA 2022 Trace (A)  Negative Final    Bilirubin (UA) 2022 Negative  Negative Final    Occult Blood UA 2022 Negative  Negative Final    Nitrite, UA 2022 Negative  Negative Final    Leukocytes, UA 2022 Negative  Negative Final       Encounter Diagnosis   Name Primary?    Sinusitis, unspecified chronicity, unspecified location Yes        No orders of the defined types were placed in this encounter.     Medications Ordered This Encounter   Medications    amoxicillin (AMOXIL) 500 MG Tab     Sig: Take 1 tablet (500 mg total) by mouth 2 (two) times daily. for 10 days     Dispense:  20 tablet     Refill:  0      Will call if no improvement or increased concerns discussed saline nasal spray for congestion  He will schedule his annual when due      E-Visit Time Trackin minutes

## 2023-11-07 ENCOUNTER — HOSPITAL ENCOUNTER (EMERGENCY)
Facility: HOSPITAL | Age: 68
Discharge: HOME | End: 2023-11-07
Attending: EMERGENCY MEDICINE
Payer: MEDICARE

## 2023-11-07 VITALS
TEMPERATURE: 99 F | BODY MASS INDEX: 23.46 KG/M2 | WEIGHT: 177 LBS | DIASTOLIC BLOOD PRESSURE: 87 MMHG | HEART RATE: 74 BPM | RESPIRATION RATE: 18 BRPM | HEIGHT: 73 IN | SYSTOLIC BLOOD PRESSURE: 131 MMHG | OXYGEN SATURATION: 96 %

## 2023-11-07 DIAGNOSIS — U07.1 COVID-19 VIRUS DETECTED: ICD-10-CM

## 2023-11-07 DIAGNOSIS — R55 SYNCOPE, UNSPECIFIED SYNCOPE TYPE: ICD-10-CM
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
## 2023-11-07 DIAGNOSIS — U07.1 COVID-19: Primary | ICD-10-CM

## 2023-11-07 DIAGNOSIS — R40.20 LOSS OF CONSCIOUSNESS: ICD-10-CM

## 2023-11-07 LAB
ALBUMIN SERPL BCP-MCNC: 3.5 G/DL (ref 3.5–5.2)
ALP SERPL-CCNC: 30 U/L (ref 55–135)
ALT SERPL W/O P-5'-P-CCNC: 10 U/L (ref 10–44)
ANION GAP SERPL CALC-SCNC: 9 MMOL/L (ref 8–16)
AST SERPL-CCNC: 24 U/L (ref 10–40)
BASOPHILS # BLD AUTO: 0.01 K/UL (ref 0–0.2)
BASOPHILS NFR BLD: 0.2 % (ref 0–1.9)
BILIRUB SERPL-MCNC: 0.5 MG/DL (ref 0.1–1)
BILIRUB UR QL STRIP: NEGATIVE
BUN SERPL-MCNC: 15 MG/DL (ref 8–23)
CALCIUM SERPL-MCNC: 9.1 MG/DL (ref 8.7–10.5)
CHLORIDE SERPL-SCNC: 103 MMOL/L (ref 95–110)
CLARITY UR REFRACT.AUTO: CLEAR
CO2 SERPL-SCNC: 26 MMOL/L (ref 23–29)
COLOR UR AUTO: YELLOW
CREAT SERPL-MCNC: 0.8 MG/DL (ref 0.5–1.4)
DIFFERENTIAL METHOD: ABNORMAL
EOSINOPHIL # BLD AUTO: 0 K/UL (ref 0–0.5)
EOSINOPHIL NFR BLD: 0 % (ref 0–8)
ERYTHROCYTE [DISTWIDTH] IN BLOOD BY AUTOMATED COUNT: 13.2 % (ref 11.5–14.5)
EST. GFR  (NO RACE VARIABLE): >60 ML/MIN/1.73 M^2
GLUCOSE SERPL-MCNC: 113 MG/DL (ref 70–110)
GLUCOSE UR QL STRIP: NEGATIVE
HCT VFR BLD AUTO: 39.9 % (ref 40–54)
HCV AB SERPL QL IA: NORMAL
HGB BLD-MCNC: 13.7 G/DL (ref 14–18)
HGB UR QL STRIP: NEGATIVE
HIV 1+2 AB+HIV1 P24 AG SERPL QL IA: NORMAL
IMM GRANULOCYTES # BLD AUTO: 0.01 K/UL (ref 0–0.04)
IMM GRANULOCYTES NFR BLD AUTO: 0.2 % (ref 0–0.5)
KETONES UR QL STRIP: ABNORMAL
LEUKOCYTE ESTERASE UR QL STRIP: NEGATIVE
LYMPHOCYTES # BLD AUTO: 0.8 K/UL (ref 1–4.8)
LYMPHOCYTES NFR BLD: 16.4 % (ref 18–48)
MCH RBC QN AUTO: 30.4 PG (ref 27–31)
MCHC RBC AUTO-ENTMCNC: 34.3 G/DL (ref 32–36)
MCV RBC AUTO: 89 FL (ref 82–98)
MICROSCOPIC COMMENT: NORMAL
MONOCYTES # BLD AUTO: 0.4 K/UL (ref 0.3–1)
MONOCYTES NFR BLD: 8.4 % (ref 4–15)
NEUTROPHILS # BLD AUTO: 3.7 K/UL (ref 1.8–7.7)
NEUTROPHILS NFR BLD: 74.8 % (ref 38–73)
NITRITE UR QL STRIP: NEGATIVE
NRBC BLD-RTO: 0 /100 WBC
PH UR STRIP: 6 [PH] (ref 5–8)
PLATELET # BLD AUTO: 115 K/UL (ref 150–450)
PMV BLD AUTO: 11.2 FL (ref 9.2–12.9)
POCT GLUCOSE: 102 MG/DL (ref 70–110)
POTASSIUM SERPL-SCNC: 4.1 MMOL/L (ref 3.5–5.1)
PROT SERPL-MCNC: 6.3 G/DL (ref 6–8.4)
PROT UR QL STRIP: NEGATIVE
RBC # BLD AUTO: 4.51 M/UL (ref 4.6–6.2)
RBC #/AREA URNS AUTO: 2 /HPF (ref 0–4)
SARS-COV-2 RDRP RESP QL NAA+PROBE: POSITIVE
SODIUM SERPL-SCNC: 138 MMOL/L (ref 136–145)
SP GR UR STRIP: 1.01 (ref 1–1.03)
TROPONIN I SERPL DL<=0.01 NG/ML-MCNC: 0.01 NG/ML (ref 0–0.03)
TROPONIN I SERPL DL<=0.01 NG/ML-MCNC: 0.01 NG/ML (ref 0–0.03)
URN SPEC COLLECT METH UR: ABNORMAL
WBC # BLD AUTO: 4.89 K/UL (ref 3.9–12.7)
WBC #/AREA URNS AUTO: 1 /HPF (ref 0–5)

## 2023-11-07 PROCEDURE — 99285 EMERGENCY DEPT VISIT HI MDM: CPT | Mod: 25,HCNC

## 2023-11-07 PROCEDURE — 93010 ELECTROCARDIOGRAM REPORT: CPT | Mod: HCNC,,, | Performed by: INTERNAL MEDICINE

## 2023-11-07 PROCEDURE — 81001 URINALYSIS AUTO W/SCOPE: CPT | Mod: HCNC

## 2023-11-07 PROCEDURE — 93005 ELECTROCARDIOGRAM TRACING: CPT | Mod: HCNC

## 2023-11-07 PROCEDURE — 93010 EKG 12-LEAD: ICD-10-PCS | Mod: HCNC,,, | Performed by: INTERNAL MEDICINE

## 2023-11-07 PROCEDURE — U0002 COVID-19 LAB TEST NON-CDC: HCPCS | Mod: HCNC | Performed by: EMERGENCY MEDICINE

## 2023-11-07 PROCEDURE — 63600175 PHARM REV CODE 636 W HCPCS: Mod: HCNC | Performed by: EMERGENCY MEDICINE

## 2023-11-07 PROCEDURE — 80053 COMPREHEN METABOLIC PANEL: CPT | Mod: HCNC

## 2023-11-07 PROCEDURE — 25500020 PHARM REV CODE 255: Mod: HCNC | Performed by: EMERGENCY MEDICINE

## 2023-11-07 PROCEDURE — 84484 ASSAY OF TROPONIN QUANT: CPT | Mod: HCNC

## 2023-11-07 PROCEDURE — 85025 COMPLETE CBC W/AUTO DIFF WBC: CPT | Mod: HCNC

## 2023-11-07 PROCEDURE — 86803 HEPATITIS C AB TEST: CPT | Mod: HCNC | Performed by: PHYSICIAN ASSISTANT

## 2023-11-07 PROCEDURE — 87389 HIV-1 AG W/HIV-1&-2 AB AG IA: CPT | Mod: HCNC | Performed by: PHYSICIAN ASSISTANT

## 2023-11-07 PROCEDURE — 96360 HYDRATION IV INFUSION INIT: CPT | Mod: HCNC

## 2023-11-07 PROCEDURE — 82962 GLUCOSE BLOOD TEST: CPT | Mod: HCNC

## 2023-11-07 RX ORDER — NIRMATRELVIR AND RITONAVIR 300-100 MG
KIT ORAL
Qty: 1 TABLET | Refills: 0 | Status: SHIPPED | OUTPATIENT
Start: 2023-11-07

## 2023-11-07 RX ADMIN — IOHEXOL 75 ML: 350 INJECTION, SOLUTION INTRAVENOUS at 08:11

## 2023-11-07 RX ADMIN — SODIUM CHLORIDE, POTASSIUM CHLORIDE, SODIUM LACTATE AND CALCIUM CHLORIDE 1000 ML: 600; 310; 30; 20 INJECTION, SOLUTION INTRAVENOUS at 06:11

## 2023-11-07 NOTE — ED PROVIDER NOTES
Encounter Date: 11/7/2023       History     Chief Complaint   Patient presents with    Loss of Consciousness     Arrived via ems from home with c/o LOC, states has had congestion and low grade temp few days, today felt a pain in his left side pain radiating into left shoulder, currently on abx of respiratory infection, pain resolved on ems arrival, pt denies injury with LOC, fell onto his bed, was pale and diaphoretic on scene, received 1L LR by EMS PTA       HPI   69 yo M with no significant pmhx presented via EMS after a loss of consciousness and fall at home. HE reported 2 days hx of rhinorrhea and flu like symptoms for which he had a virtual appointment with his PCP yesterday. He was prescribed amoxicillin which he has been using. Today, he experienced migratory lower back pain after standing up from the chair to use the toilet. He got the bathroom and he had loc due to the severity of the pain. He woke up in a pool of sweat. He fell but denied hitting his head. Per EMS, he had significantly low BP on arrival which improved after the administration of 1L of LR. He denied pain in any part of his body, f/v/n/d. He denies felling lightheaded.     Review of patient's allergies indicates:  No Known Allergies  Past Medical History:   Diagnosis Date    Hyperlipidemia     Hypertension      Past Surgical History:   Procedure Laterality Date    VASECTOMY  1975     Family History   Problem Relation Age of Onset    Coronary artery disease Mother     Hypertension Mother     Heart disease Father     Hypertension Father     Prostate cancer Father      Social History     Tobacco Use    Smoking status: Never    Smokeless tobacco: Never   Substance Use Topics    Alcohol use: Yes     Comment: SOCIALLY    Drug use: No     Review of Systems  See HPI  Physical Exam     Initial Vitals [11/07/23 1629]   BP Pulse Resp Temp SpO2   106/72 70 18 99.1 °F (37.3 °C) 100 %      MAP       --         Physical Exam    Constitutional: He appears  well-developed and well-nourished.   HENT:   Head: Normocephalic and atraumatic.   Eyes: EOM are normal. Pupils are equal, round, and reactive to light.   Neck: Neck supple.   Normal range of motion.  Cardiovascular:  Normal rate, regular rhythm, normal heart sounds and intact distal pulses.     Exam reveals no friction rub.       No murmur heard.  Pulmonary/Chest: Breath sounds normal. No respiratory distress. He has no wheezes. He has no rhonchi. He has no rales. He exhibits no tenderness.   Abdominal: Bowel sounds are normal. He exhibits no distension and no mass. There is no abdominal tenderness. There is no rebound and no guarding.   Musculoskeletal:         General: No tenderness or edema. Normal range of motion.      Cervical back: Normal range of motion and neck supple.     Neurological: He is alert and oriented to person, place, and time. He has normal strength.         ED Course   Procedures  Labs Reviewed   URINALYSIS, REFLEX TO URINE CULTURE - Abnormal; Notable for the following components:       Result Value    Ketones, UA 1+ (*)     All other components within normal limits    Narrative:     Specimen Source->Urine   COMPREHENSIVE METABOLIC PANEL - Abnormal; Notable for the following components:    Glucose 113 (*)     Alkaline Phosphatase 30 (*)     All other components within normal limits   CBC W/ AUTO DIFFERENTIAL - Abnormal; Notable for the following components:    RBC 4.51 (*)     Hemoglobin 13.7 (*)     Hematocrit 39.9 (*)     Platelets 115 (*)     Lymph # 0.8 (*)     Gran % 74.8 (*)     Lymph % 16.4 (*)     All other components within normal limits   SARS-COV-2 RNA AMPLIFICATION, QUAL - Abnormal; Notable for the following components:    SARS-CoV-2 RNA, Amplification, Qual Positive (*)     All other components within normal limits   HIV 1 / 2 ANTIBODY    Narrative:     Release to patient->Immediate   HEPATITIS C ANTIBODY    Narrative:     Release to patient->Immediate   TROPONIN I   TROPONIN I    TROPONIN I    Narrative:     Add on Trop per Dr. Zhang @ 19:14 pm to order # [id number]   URINALYSIS MICROSCOPIC    Narrative:     Specimen Source->Urine   POCT GLUCOSE   POCT GLUCOSE MONITORING CONTINUOUS          Imaging Results              CT Abdomen Pelvis With IV Contrast (In process)  Result time 11/07/23 21:20:32                     X-Ray Chest 1 View (Final result)  Result time 11/07/23 18:35:57      Final result by Georgie Lindquist MD (11/07/23 18:35:57)                   Impression:      No acute abnormality.      Electronically signed by: Georgie Lindquist  Date:    11/07/2023  Time:    18:35               Narrative:    EXAMINATION:  XR CHEST 1 VIEW    CLINICAL HISTORY:  Unspecified coma    TECHNIQUE:  Single frontal view of the chest was performed.    COMPARISON:  None    FINDINGS:  Cardiac silhouette is not enlarged.  The lungs appear clear.  There is no pleural effusion or pneumothorax.  Osseous structures are intact.                                       Medications - No data to display  Medical Decision Making  69 yo M with no significant pmhx presented via EMS after a loss of consciousness and fall at home. He has no history of headaches, heart disease or arrhythmias.     Amount and/or Complexity of Data Reviewed  Labs: ordered. Decision-making details documented in ED Course.  Radiology: ordered.    Risk  Prescription drug management.              Attending Attestation:             Attending ED Notes:   Attending Note:  I have seen the patient, have repeated the key portions of the history and physical, reviewed and agree with the medical documentation, and supervised and managed the medical care of the patient. Additionally, I was present for the critical portion of any procedure(s) performed.      68-year-old male past medical history of hypertension, hyperlipidemia presenting today for syncopal episode.  Reported nasal congestion x2 days with history of sinusitis, started on Augmentin.   Earlier today got up to use the bathroom and felt severe pain in the lower back that radiated to his left side with associated numbness of the back.  He passed out and woke up on the ground.  He called EMS, was noted to be hypotensive.  Currently denies chest pain, abdominal pain, back pain on arrival.    Overall well-appearing, no apparent distress.  Abdomen soft, nontender.  Pulses intact and strong throughout ( radial, femoral, DP)  Neurologically intact    Labs with no leukocytosis, hemoglobin stable.  CMP largely unremarkable.  UA with 1+ ketones, no evidence of infection.  Chest x-ray negative for pneumonia.    Patient reports improvement of symptoms after IV fluids.    Turned over to oncoming team pending CT abdomen pelvis.  If negative, anticipate discharge.  Prescription for Paxlovid has been sent to the pharmacy.  Return precautions given.    YINA Zhang MD  Staff ED Physician  11/07/2023 9:27 PM            ED Course as of 11/07/23 2103 Tue Nov 07, 2023 1838 WBC: 4.89 [GM]   1838 Hemoglobin(!): 13.7 [GM]   2014 Chest x-ray:  No acute process. [GM]   2014 Labs reviewed with no leukocytosis.  Hemoglobin stable.  CMP largely unremarkable.    UA with 1+ ketones.  IV fluids infusing. [GM]   2103 SARS-CoV-2 RNA, Amplification, Qual(!): Positive [GM]   2103 Troponin I: 0.015 [GM]      ED Course User Index  [GM] Denia Zhang MD                      Clinical Impression:   Final diagnoses:  [R07.9] Chest pain  [W19.XXXA] Fall  [R40.20] Loss of consciousness               Manuel Alfonso MD  Resident  11/07/23 2018       Denia Zhang MD  11/07/23 2127

## 2023-11-07 NOTE — ED TRIAGE NOTES
Pt. Is a 68 yr old  male presenting to the ED after passing out.  Hx sinus infection Started Amoxicillin yesterday morning. Pt. Had gotten up from chair to the bathroom when he started having lower mid back pain, accompanied by pain in the left shoulder to the fingers. Pt. +LOC.

## 2023-11-08 NOTE — PROVIDER PROGRESS NOTES - EMERGENCY DEPT.
Signout Note    I received signout from the previous provider.     Chief complaint:  Loss of Consciousness (Arrived via ems from home with c/o LOC, states has had congestion and low grade temp few days, today felt a pain in his left side pain radiating into left shoulder, currently on abx of respiratory infection, pain resolved on ems arrival, pt denies injury with LOC, fell onto his bed, was pale and diaphoretic on scene, received 1L LR by EMS PTA/)      Pertinent history &exam:  Fausto Aldana is a 68 y.o. male who presented to emergency department with complaint of syncope.  He has had nasal congestion and a low-grade fever for the past few days.  Recently started on amoxicillin for suspected sinusitis.    Received fluids from EMS.    EKG reviewed by prior team, no ischemic change.  Labs were obtained.  There was no leukocytosis or acute anemia.  No acute kidney injury, electrolyte derangement.  He was found to be COVID positive.  He has no hypoxia.    Troponin negative x2.  Urinalysis with 1+ ketones, otherwise unremarkable.    Chest x-ray without acute abnormality.      He is able to ambulate independently here in the ER.  He was signed out pending CT scan abdomen pelvis for final disposition.    Vitals:    11/07/23 2247   BP: 131/87   Pulse: 74   Resp: 18   Temp: 99.4 °F (37.4 °C)       Imaging Studies:    CT Abdomen Pelvis With IV Contrast   Final Result      No acute abnormality identified in the abdomen or pelvis.      Mild anasarca and trace free fluid in the lower abdomen and pelvis, nonspecific.      Hepatic cyst.      Prostatomegaly.      Additional findings as above.      Electronically signed by resident: Elodia Caal   Date:    11/07/2023   Time:    21:20      Electronically signed by: Silas Jorge MD   Date:    11/07/2023   Time:    21:59      X-Ray Chest 1 View   Final Result      No acute abnormality.         Electronically signed by: Georgie Lindquist   Date:    11/07/2023   Time:    18:35           Medications Given:  Medications   lactated ringers bolus 1,000 mL (0 mLs Intravenous Stopped 11/7/23 1956)   iohexoL (OMNIPAQUE 350) injection 75 mL (75 mLs Intravenous Given 11/7/23 2044)       Pending Items/ MDM:  68 y.o. male with above complaint.      CT abdomen pelvis obtained, no acute abnormality.  Some incidental findings including a hepatic cyst and prostatomegaly, which patient was made aware of.      Paxlovid was sent to the patient's pharmacy by the previous physician.  He was discharged home in stable condition.  Return precautions discussed at bedside.    Diagnostic Impression:    1. COVID-19    2. Fall    3. Loss of consciousness    4. Syncope, unspecified syncope type         ED Disposition Condition    Discharge Stable          ED Prescriptions       Medication Sig Dispense Start Date End Date Auth. Provider    nirmatrelvir-ritonavir (PAXLOVID) 300 mg (150 mg x 2)-100 mg copackaged tablets (EUA) Take 3 tablets by mouth 2 (two) times daily. Each dose contains 2 nirmatrelvir (pink tablets) and 1 ritonavir (white tablet). Take all 3 tablets together for 5 days 1 tablet 11/7/2023 -- Denia Zhang MD          Follow-up Information    None         Patient an family member at bedside understand the plan and in agreement, verbalized understanding, questions answered    Erika Soria MD  Emergency Medicine

## 2023-11-08 NOTE — DISCHARGE INSTRUCTIONS
You have COVID.  I have sent a prescription for Paxlovid to the pharmacy.  You can not take Viagra or your Lipitor while taking this medication as it can cause extremely high levels of it in her blood.  You can resume taking your usual medications after you have completed the course of Paxlovid.  Return to emergency department for any concerning symptom.  Stay hydrated.

## 2023-11-08 NOTE — ED NOTES
Received report from DOLORES Garsia. Assumed care of patient at this time.  Pt AAOx4, resting comfortably in bed, NAD, respirations E/UL, updated on POC, wheels locked and in low position, call bell with in reach, Comfort positioning and restroom needs were addressed. Necessary items were placed with in reach and was advised when a reassessment would take place.

## 2023-12-19 ENCOUNTER — OFFICE VISIT (OUTPATIENT)
Dept: INTERNAL MEDICINE | Facility: CLINIC | Age: 68
End: 2023-12-19
Payer: MEDICARE

## 2023-12-19 VITALS
HEIGHT: 73 IN | SYSTOLIC BLOOD PRESSURE: 118 MMHG | BODY MASS INDEX: 24.2 KG/M2 | WEIGHT: 182.56 LBS | HEART RATE: 63 BPM | DIASTOLIC BLOOD PRESSURE: 78 MMHG

## 2023-12-19 DIAGNOSIS — R94.31 ABNORMAL EKG: ICD-10-CM

## 2023-12-19 DIAGNOSIS — R10.9 ABDOMINAL PAIN, UNSPECIFIED ABDOMINAL LOCATION: ICD-10-CM

## 2023-12-19 DIAGNOSIS — R55 SYNCOPE AND COLLAPSE: Primary | ICD-10-CM

## 2023-12-19 PROCEDURE — 1159F PR MEDICATION LIST DOCUMENTED IN MEDICAL RECORD: ICD-10-PCS | Mod: HCNC,CPTII,S$GLB, | Performed by: INTERNAL MEDICINE

## 2023-12-19 PROCEDURE — 3078F DIAST BP <80 MM HG: CPT | Mod: HCNC,CPTII,S$GLB, | Performed by: INTERNAL MEDICINE

## 2023-12-19 PROCEDURE — 3074F PR MOST RECENT SYSTOLIC BLOOD PRESSURE < 130 MM HG: ICD-10-PCS | Mod: HCNC,CPTII,S$GLB, | Performed by: INTERNAL MEDICINE

## 2023-12-19 PROCEDURE — 1160F PR REVIEW ALL MEDS BY PRESCRIBER/CLIN PHARMACIST DOCUMENTED: ICD-10-PCS | Mod: HCNC,CPTII,S$GLB, | Performed by: INTERNAL MEDICINE

## 2023-12-19 PROCEDURE — 99999 PR PBB SHADOW E&M-EST. PATIENT-LVL II: ICD-10-PCS | Mod: PBBFAC,HCNC,, | Performed by: INTERNAL MEDICINE

## 2023-12-19 PROCEDURE — 3008F PR BODY MASS INDEX (BMI) DOCUMENTED: ICD-10-PCS | Mod: HCNC,CPTII,S$GLB, | Performed by: INTERNAL MEDICINE

## 2023-12-19 PROCEDURE — 99214 OFFICE O/P EST MOD 30 MIN: CPT | Mod: HCNC,S$GLB,, | Performed by: INTERNAL MEDICINE

## 2023-12-19 PROCEDURE — 99214 PR OFFICE/OUTPT VISIT, EST, LEVL IV, 30-39 MIN: ICD-10-PCS | Mod: HCNC,S$GLB,, | Performed by: INTERNAL MEDICINE

## 2023-12-19 PROCEDURE — 3078F PR MOST RECENT DIASTOLIC BLOOD PRESSURE < 80 MM HG: ICD-10-PCS | Mod: HCNC,CPTII,S$GLB, | Performed by: INTERNAL MEDICINE

## 2023-12-19 PROCEDURE — 3074F SYST BP LT 130 MM HG: CPT | Mod: HCNC,CPTII,S$GLB, | Performed by: INTERNAL MEDICINE

## 2023-12-19 PROCEDURE — 99999 PR PBB SHADOW E&M-EST. PATIENT-LVL II: CPT | Mod: PBBFAC,HCNC,, | Performed by: INTERNAL MEDICINE

## 2023-12-19 PROCEDURE — 1159F MED LIST DOCD IN RCRD: CPT | Mod: HCNC,CPTII,S$GLB, | Performed by: INTERNAL MEDICINE

## 2023-12-19 PROCEDURE — 1160F RVW MEDS BY RX/DR IN RCRD: CPT | Mod: HCNC,CPTII,S$GLB, | Performed by: INTERNAL MEDICINE

## 2023-12-19 PROCEDURE — 3008F BODY MASS INDEX DOCD: CPT | Mod: HCNC,CPTII,S$GLB, | Performed by: INTERNAL MEDICINE

## 2023-12-19 NOTE — PROGRESS NOTES
"Subjective:       Patient ID: Fausto Aldana is a 68 y.o. male.    Chief Complaint: Follow-up  This is a 68-year-old who presents today for follow-up he developed upper respiratory infection in November and face it was not eating or drinking very well for few days he reports that he felt weak and had a syncopal episode called 911 and was taken to the hospital was diagnosed with COVID also had some abdominal and back pain at the time he had chest x-ray that was normal in his CT scan showed a cyst on the liver and some fluid after he would gotten some fluid in the ER and in the ambulance.  He denies abdominal pain or bowel changes.  EKG showed incomplete right bundle .  He is denied chest pain syncopal episode or weakness since that time he is feeling back to baseline .  He did have a few episodes of sweating for a few weeks after his illness but that resolved as well.  He never had any fever and mostly had nasal congestion  continues to work as .  He also gets some discomfort in the right knee intermittently comes and goes it isn't all the time when it comes he takes ibuprofen for few days and then it goes back away    Follow-up  Pertinent negatives include no coughing or fatigue.     Review of Systems   Constitutional:  Negative for fatigue.   Respiratory:  Negative for cough and shortness of breath.        Objective:    Blood pressure 118/78, pulse 63, height 6' 1" (1.854 m), weight 82.8 kg (182 lb 8.7 oz).   Physical Exam  Constitutional:       General: He is not in acute distress.  HENT:      Head: Normocephalic.      Mouth/Throat:      Pharynx: Oropharynx is clear.   Eyes:      General: No scleral icterus.  Cardiovascular:      Rate and Rhythm: Normal rate and regular rhythm.      Heart sounds: Normal heart sounds. No murmur heard.     No friction rub. No gallop.   Pulmonary:      Effort: Pulmonary effort is normal. No respiratory distress.      Breath sounds: Normal breath sounds.   Abdominal:      " General: Bowel sounds are normal.      Palpations: Abdomen is soft. There is no mass.      Tenderness: There is no abdominal tenderness.   Musculoskeletal:      Comments: Right knee normal rom    Skin:     Findings: No erythema.   Neurological:      Mental Status: He is alert.   Psychiatric:         Mood and Affect: Mood normal.         Assessment:       1. Syncope and collapse    2. Abnormal EKG    3. Abdominal pain, unspecified abdominal location        Plan:       Fausto was seen today for follow-up.    Diagnoses and all orders for this visit:    Syncope and collapse  Recent episode of likely due to COVID he improved with hydration  He will monitor for any recurrence continued hydration    Abnormal EKG  Incomplete right bundle he is asymptomatic we discussed further testing declined at this point may consider repeat EKG when he returns for his physical    Abdominal pain, unspecified abdominal location  Related to his COVID diagnosis he did have a bit of fluid noted at that time we can repeat the imaging he would like to wait until closer to his physical but will call when he is ready to schedule or if symptoms develop prior    For his knee pain osteoarthritis conservative measures but discussed ortho consult if persist he will call if he would like to do so    Return for his annual sooner if concern

## 2024-02-20 ENCOUNTER — OFFICE VISIT (OUTPATIENT)
Dept: DERMATOLOGY | Facility: CLINIC | Age: 69
End: 2024-02-20
Payer: MEDICARE

## 2024-02-20 DIAGNOSIS — L30.9 DERMATITIS, UNSPECIFIED: Primary | ICD-10-CM

## 2024-02-20 DIAGNOSIS — L82.1 SEBORRHEIC KERATOSES: ICD-10-CM

## 2024-02-20 PROCEDURE — 3288F FALL RISK ASSESSMENT DOCD: CPT | Mod: CPTII,S$GLB,, | Performed by: STUDENT IN AN ORGANIZED HEALTH CARE EDUCATION/TRAINING PROGRAM

## 2024-02-20 PROCEDURE — 99203 OFFICE O/P NEW LOW 30 MIN: CPT | Mod: S$GLB,,, | Performed by: STUDENT IN AN ORGANIZED HEALTH CARE EDUCATION/TRAINING PROGRAM

## 2024-02-20 PROCEDURE — 1101F PT FALLS ASSESS-DOCD LE1/YR: CPT | Mod: CPTII,S$GLB,, | Performed by: STUDENT IN AN ORGANIZED HEALTH CARE EDUCATION/TRAINING PROGRAM

## 2024-02-20 PROCEDURE — 1160F RVW MEDS BY RX/DR IN RCRD: CPT | Mod: CPTII,S$GLB,, | Performed by: STUDENT IN AN ORGANIZED HEALTH CARE EDUCATION/TRAINING PROGRAM

## 2024-02-20 PROCEDURE — 1126F AMNT PAIN NOTED NONE PRSNT: CPT | Mod: CPTII,S$GLB,, | Performed by: STUDENT IN AN ORGANIZED HEALTH CARE EDUCATION/TRAINING PROGRAM

## 2024-02-20 PROCEDURE — 99999 PR PBB SHADOW E&M-EST. PATIENT-LVL III: CPT | Mod: PBBFAC,,, | Performed by: STUDENT IN AN ORGANIZED HEALTH CARE EDUCATION/TRAINING PROGRAM

## 2024-02-20 PROCEDURE — 1159F MED LIST DOCD IN RCRD: CPT | Mod: CPTII,S$GLB,, | Performed by: STUDENT IN AN ORGANIZED HEALTH CARE EDUCATION/TRAINING PROGRAM

## 2024-02-20 NOTE — PROGRESS NOTES
Subjective:      Patient ID:  Fausto Aldana is a 68 y.o. male who presents for   Chief Complaint   Patient presents with    Mass     Base of spine- referral from PCP     Fausto Aldana is a 68 y.o. male who presents for: Skin lesion evaluation    New patient    The patient has the following lesions of concern:  Location: gluteal fold  Duration: 1 month  Symptoms: tenderness  Relieving factors/Previous treatments: none  Declines FBSE  [facility]  Hx pilonidal cyst    Pertinent history:  History of blistering sunburns: No  History of tanning bed use: No  Family history of melanoma: No  Personal history of mole removal: Yes  Personal history of skin cancer: No        Review of Systems   Hematologic/Lymphatic: Does not bruise/bleed easily.       Objective:   Physical Exam   Skin:   Areas Examined (abnormalities noted in diagram):   Back Inspection Performed            Diagram Legend     Erythematous scaling macule/papule c/w actinic keratosis       Vascular papule c/w angioma      Pigmented verrucoid papule/plaque c/w seborrheic keratosis      Yellow umbilicated papule c/w sebaceous hyperplasia      Irregularly shaped tan macule c/w lentigo     1-2 mm smooth white papules consistent with Milia      Movable subcutaneous cyst with punctum c/w epidermal inclusion cyst      Subcutaneous movable cyst c/w pilar cyst      Firm pink to brown papule c/w dermatofibroma      Pedunculated fleshy papule(s) c/w skin tag(s)      Evenly pigmented macule c/w junctional nevus     Mildly variegated pigmented, slightly irregular-bordered macule c/w mildly atypical nevus      Flesh colored to evenly pigmented papule c/w intradermal nevus       Pink pearly papule/plaque c/w basal cell carcinoma      Erythematous hyperkeratotic cursted plaque c/w SCC      Surgical scar with no sign of skin cancer recurrence      Open and closed comedones      Inflammatory papules and pustules      Verrucoid papule consistent  consistent with wart     Erythematous eczematous patches and plaques     Dystrophic onycholytic nail with subungual debris c/w onychomycosis     Umbilicated papule    Erythematous-base heme-crusted tan verrucoid plaque consistent with inflamed seborrheic keratosis     Erythematous Silvery Scaling Plaque c/w Psoriasis     See annotation  Focused exam of back, chest, buttocks    Assessment / Plan:        Dermatitis, unspecified  L superior gluteal fold eczematous thin plaque about 2-3 cm   Ddx eczema (ICD v ACD) v psoriasis v Roney (SCCis)  Recommend d/c Axe body wash - switch to fragrance free Dove soap  Vaseline multiple times a day for barrier protection  Triamcinolone ointment (He has this at home) twice daily 2 weeks on, 1 weeks off until clear, stop when clear, reviewed se skin thinning  Rtc 2-3 months if not resolved and will biopsy to r/o Roney    Seborrheic keratoses  These are benign inherited growths without a malignant potential. Reassurance given to patient. No treatment is necessary.     RTC 2-3 months if not resolved, cancel if resolved

## 2024-02-25 ENCOUNTER — PATIENT MESSAGE (OUTPATIENT)
Dept: ADMINISTRATIVE | Facility: HOSPITAL | Age: 69
End: 2024-02-25
Payer: MEDICARE

## 2024-04-12 ENCOUNTER — PATIENT MESSAGE (OUTPATIENT)
Dept: INTERNAL MEDICINE | Facility: CLINIC | Age: 69
End: 2024-04-12
Payer: MEDICARE

## 2024-04-12 DIAGNOSIS — E78.5 HYPERLIPIDEMIA, UNSPECIFIED HYPERLIPIDEMIA TYPE: ICD-10-CM

## 2024-04-12 RX ORDER — TAMSULOSIN HYDROCHLORIDE 0.4 MG/1
0.4 CAPSULE ORAL DAILY
Qty: 90 CAPSULE | Refills: 3 | Status: SHIPPED | OUTPATIENT
Start: 2024-04-12 | End: 2025-04-12

## 2024-04-12 RX ORDER — ATORVASTATIN CALCIUM 40 MG/1
40 TABLET, FILM COATED ORAL DAILY
Qty: 90 TABLET | Refills: 4 | Status: SHIPPED | OUTPATIENT
Start: 2024-04-12

## 2024-04-12 NOTE — TELEPHONE ENCOUNTER
Care Due:                  Date            Visit Type   Department     Provider  --------------------------------------------------------------------------------                                MYCHART                              FOLLOWUP/OF  [facility] INTERNAL  Kay Barrios  Last Visit: 12-      FICE VISIT   MEDICINE       [place]ee                               -                              PRIMARY      [facility] INTERNAL  Kay Barrios  Next Visit: 09-      CARE ([facility])   [facility]       Gee                                                            Last  Test          Frequency    Reason                     Performed    Due Date  --------------------------------------------------------------------------------    Lipid Panel.  12 months..  atorvastatin.............  Not Found    Overdue    [employer] Embedded Care Due Messages. Reference number: [id number].   4/12/2024 2:31:31 PM CDT

## 2024-04-30 ENCOUNTER — OFFICE VISIT (OUTPATIENT)
Dept: INTERNAL MEDICINE | Facility: CLINIC | Age: 69
End: 2024-04-30
Payer: MEDICARE

## 2024-04-30 VITALS
HEIGHT: 73 IN | HEART RATE: 84 BPM | DIASTOLIC BLOOD PRESSURE: 80 MMHG | WEIGHT: 182.56 LBS | OXYGEN SATURATION: 98 % | SYSTOLIC BLOOD PRESSURE: 120 MMHG | BODY MASS INDEX: 24.2 KG/M2

## 2024-04-30 DIAGNOSIS — R97.20 ELEVATED PSA: ICD-10-CM

## 2024-04-30 DIAGNOSIS — E78.5 HYPERLIPIDEMIA, UNSPECIFIED HYPERLIPIDEMIA TYPE: Primary | ICD-10-CM

## 2024-04-30 DIAGNOSIS — N40.1 BENIGN PROSTATIC HYPERPLASIA WITH URINARY HESITANCY: ICD-10-CM

## 2024-04-30 DIAGNOSIS — R39.11 BENIGN PROSTATIC HYPERPLASIA WITH URINARY HESITANCY: ICD-10-CM

## 2024-04-30 PROCEDURE — 1159F MED LIST DOCD IN RCRD: CPT | Mod: CPTII,S$GLB,, | Performed by: INTERNAL MEDICINE

## 2024-04-30 PROCEDURE — 1101F PT FALLS ASSESS-DOCD LE1/YR: CPT | Mod: CPTII,S$GLB,, | Performed by: INTERNAL MEDICINE

## 2024-04-30 PROCEDURE — 3074F SYST BP LT 130 MM HG: CPT | Mod: CPTII,S$GLB,, | Performed by: INTERNAL MEDICINE

## 2024-04-30 PROCEDURE — 3079F DIAST BP 80-89 MM HG: CPT | Mod: CPTII,S$GLB,, | Performed by: INTERNAL MEDICINE

## 2024-04-30 PROCEDURE — 3288F FALL RISK ASSESSMENT DOCD: CPT | Mod: CPTII,S$GLB,, | Performed by: INTERNAL MEDICINE

## 2024-04-30 PROCEDURE — 99214 OFFICE O/P EST MOD 30 MIN: CPT | Mod: S$GLB,,, | Performed by: INTERNAL MEDICINE

## 2024-04-30 PROCEDURE — 1160F RVW MEDS BY RX/DR IN RCRD: CPT | Mod: CPTII,S$GLB,, | Performed by: INTERNAL MEDICINE

## 2024-04-30 PROCEDURE — 1126F AMNT PAIN NOTED NONE PRSNT: CPT | Mod: CPTII,S$GLB,, | Performed by: INTERNAL MEDICINE

## 2024-04-30 PROCEDURE — 3008F BODY MASS INDEX DOCD: CPT | Mod: CPTII,S$GLB,, | Performed by: INTERNAL MEDICINE

## 2024-04-30 PROCEDURE — 99999 PR PBB SHADOW E&M-EST. PATIENT-LVL IV: CPT | Mod: PBBFAC,,, | Performed by: INTERNAL MEDICINE

## 2024-04-30 NOTE — PROGRESS NOTES
"Subjective:       Patient ID: Fausto Aldana is a 69 y.o. male.    Chief Complaint: Follow-up  This is a 69-year-old who presents today for follow-up.  He reports he has been feeling well physically still occasionally has some issues with his knees but no further syncopal episodes since his COVID.  He reports he did get the RSV and shingles vaccines and is up-to-date on those he [facility] and had some labs recently.  PSA was elevated compared to his baseline and he has had some issues with urinary stream he felt it did improve with a Flomax initially but now seems to be increasing.  He does have family history of prostate cancer.  His cholesterol is a bit up he notes that he was eating some more eggs and also a protein rich shake he does continue to take his cholesterol medicine without difficulty    Follow-up      Review of Systems   Genitourinary:  Positive for difficulty urinating and frequency.   Musculoskeletal:         Occasional arthirtis knee        Objective:    Blood pressure 120/80, pulse 84, height 6' 1" (1.854 m), weight 82.8 kg (182 lb 8.7 oz), SpO2 98%.   Physical Exam  Constitutional:       General: He is not in acute distress.  Cardiovascular:      Rate and Rhythm: Normal rate and regular rhythm.      Heart sounds: Normal heart sounds. No murmur heard.     No friction rub. No gallop.   Pulmonary:      Effort: Pulmonary effort is normal. No respiratory distress.      Breath sounds: Normal breath sounds.   Abdominal:      General: Bowel sounds are normal.      Palpations: Abdomen is soft. There is no mass.      Tenderness: There is no abdominal tenderness.   Neurological:      Mental Status: He is alert.   Psychiatric:         Mood and Affect: Mood normal.         Assessment:       1. Hyperlipidemia, unspecified hyperlipidemia type    2. Elevated PSA    3. Benign prostatic hyperplasia with urinary hesitancy        Plan:       Fausto was seen today for " follow-up.    Diagnoses and all orders for this visit:    Hyperlipidemia, unspecified hyperlipidemia type  Cholesterol acceptable maintain his current regimen continue with his exercise he stays active and dietary measures as he plans to do so reducing his egg intake and healthier protein shakes    Elevated PSA  -     Ambulatory referral/consult to Urology; Future    Benign prostatic hyperplasia with urinary hesitancy  Discussed he has been using Flomax with some improvement but feels some increased symptoms with recent elevation in his PSA from [facility] up to 4.93 with famliy history  Discussed with patient referral placed to Urology he will call to schedule at his convenience

## 2024-05-06 NOTE — PROGRESS NOTES
"Subjective:      Patient ID: Fausto Aldana is a 69 y.o. male.    Chief Complaint: Elevated psa  HPI  Patient is a 69 y.o. male who is new to our clinic and referred by their PCP, Dr. Flynn for evaluation of elevated psa.     PSA was elevated compared to his baseline and he has had some issues with urinary stream he felt it did improve with a Flomax initially but now seems to be increasing. He does have family history of prostate cancer.   Reports in 2022, his psa was 2.1, 2023, 2.45, 2024 4.93 (in media, 4/11/24).    [facility]   Patient has + fh of prostate cancer--FATHER, underwent a prostatectomy at the age of 58.     Patient reports excellent health.  He remains very active.   Has minor voiding issues for which he takes tamsulosin and is happy.   He has not taken viagra for 2 years +.  This is not a major priority for him at this time.     IPSS Questionnaire (AUA-SS) 5/7/24:  Over the past month    1)  Incomplete Emptying - How often have you had a sensation of not emptying your bladder completely after you finish urinating?  1 - Less than 1 time in 5   2)  Frequency - How often have you had to urinate again less than two hours after you finished urinating? 1 - Less than 1 time in 5   3)  Intermittency - How often have you found you stopped and started again several times when you urinated?  1 - Less than 1 time in 5   4) Urgency - How difficult have you found it to postpone urination?  1 - Less than 1 time in 5   5) Weak Stream - How often have you had a weak urinary stream?  2 - Less than half the time   6) Straining  - How often have you had to push or strain to begin urination?  0 - Not at all   7) Nocturia - How many times did you most typically get up to urinate from the time you went to bed until the time you got up in the morning?  1 - 1 time   Total score:  0-7 mild, 8-19 moderate, 20-35 severe 7   Quality of Life:  2 - Mostly Satisfied          No results found for: "PSA", " ""PSADIAG", "PSATOTAL", "PSAFREE"      Review of Systems  All other systems reviewed and negative except pertinent positives noted in HPI.    Objective:      Physical Exam  Vitals and nursing note reviewed.   Constitutional:       Appearance: Normal appearance. He is well-developed.   HENT:      Head: Normocephalic.   Neck:      Trachea: No tracheal deviation.   Cardiovascular:      Rate and Rhythm: Normal rate.   Pulmonary:      Effort: Pulmonary effort is normal. No tachypnea, accessory muscle usage or respiratory distress.   Abdominal:      General: There is no distension.      Palpations: Abdomen is soft. There is no fluid wave or mass.      Tenderness: There is no abdominal tenderness. There is no rebound.      Hernia: No hernia is present. There is no hernia in the right inguinal area.          Comments: Liver/spleen non-palpable.   Genitourinary:     Penis: Normal and circumcised.       Testes:         Right: Mass or tenderness not present. Right testis is descended.      Prostate: Normal. Not tender.      Rectum: No mass, tenderness or external hemorrhoid. Normal anal tone.      Comments: Scrotum showed no rashes or lesions.  Anus/perineum without lesion.  Left testis surgically absent. No meatal stenosis, meatus in normal location. No penile discharge/plaques. Prostate smooth, no nodules, 40grams.  Seminal vesicles non-palpable.  Normal sphincter tone.       Musculoskeletal:         General: Normal range of motion.   Lymphadenopathy:      Lower Body: No right inguinal adenopathy.   Skin:     Findings: No bruising or rash.   Neurological:      Mental Status: He is alert and oriented to person, place, and time.   Psychiatric:         Speech: Speech normal.         Behavior: Behavior normal. Behavior is cooperative.         Thought Content: Thought content normal.         Assessment:       1. Elevated PSA    2. Benign non-nodular prostatic hyperplasia with lower urinary tract symptoms    3. Erectile dysfunction " due to arterial insufficiency        Plan:     1. Elevated PSA    2. Benign non-nodular prostatic hyperplasia with lower urinary tract symptoms    3. Erectile dysfunction due to arterial insufficiency        No orders of the defined types were placed in this encounter.      1. Elevated PSA:   - The patient was counseled on the implications of an elevated PSA. It was explained in detail that this is a screening test and does not indicate any known presence of prostate cancer but that he is at increased risk given he has an elevated PSA. All his questions were answered by me fully.     -The patient will be scheduled for a prostate MRI.      2. BPH:  -continue flomax  -A post void residual bladder scan was performed immediately after voiding. The patient's PVR was noted to be 65cc.  -Avoid bladder irritants including but not limited to caffeine, alcohol, smoking, spicy foods, acidic foods, tomato-based products, citrus, artificial sweeteners, chocolate, coffee or tea.    3. ED:   -continue viagra prn.

## 2024-05-07 ENCOUNTER — OFFICE VISIT (OUTPATIENT)
Dept: UROLOGY | Facility: CLINIC | Age: 69
End: 2024-05-07
Payer: MEDICARE

## 2024-05-07 VITALS
SYSTOLIC BLOOD PRESSURE: 122 MMHG | HEART RATE: 86 BPM | BODY MASS INDEX: 23.86 KG/M2 | HEIGHT: 73 IN | DIASTOLIC BLOOD PRESSURE: 82 MMHG | WEIGHT: 180 LBS

## 2024-05-07 DIAGNOSIS — N52.01 ERECTILE DYSFUNCTION DUE TO ARTERIAL INSUFFICIENCY: ICD-10-CM

## 2024-05-07 DIAGNOSIS — N40.1 BENIGN NON-NODULAR PROSTATIC HYPERPLASIA WITH LOWER URINARY TRACT SYMPTOMS: ICD-10-CM

## 2024-05-07 DIAGNOSIS — R97.20 ELEVATED PSA: Primary | ICD-10-CM

## 2024-05-07 PROCEDURE — 3288F FALL RISK ASSESSMENT DOCD: CPT | Mod: CPTII,S$GLB,, | Performed by: UROLOGY

## 2024-05-07 PROCEDURE — 99999 PR PBB SHADOW E&M-EST. PATIENT-LVL III: CPT | Mod: PBBFAC,,, | Performed by: UROLOGY

## 2024-05-07 PROCEDURE — 1101F PT FALLS ASSESS-DOCD LE1/YR: CPT | Mod: CPTII,S$GLB,, | Performed by: UROLOGY

## 2024-05-07 PROCEDURE — 1160F RVW MEDS BY RX/DR IN RCRD: CPT | Mod: CPTII,S$GLB,, | Performed by: UROLOGY

## 2024-05-07 PROCEDURE — 3074F SYST BP LT 130 MM HG: CPT | Mod: CPTII,S$GLB,, | Performed by: UROLOGY

## 2024-05-07 PROCEDURE — 99204 OFFICE O/P NEW MOD 45 MIN: CPT | Mod: S$GLB,,, | Performed by: UROLOGY

## 2024-05-07 PROCEDURE — 1126F AMNT PAIN NOTED NONE PRSNT: CPT | Mod: CPTII,S$GLB,, | Performed by: UROLOGY

## 2024-05-07 PROCEDURE — 3008F BODY MASS INDEX DOCD: CPT | Mod: CPTII,S$GLB,, | Performed by: UROLOGY

## 2024-05-07 PROCEDURE — 3079F DIAST BP 80-89 MM HG: CPT | Mod: CPTII,S$GLB,, | Performed by: UROLOGY

## 2024-05-07 PROCEDURE — 1159F MED LIST DOCD IN RCRD: CPT | Mod: CPTII,S$GLB,, | Performed by: UROLOGY

## 2024-05-07 RX ORDER — DIAZEPAM 5 MG/1
5 TABLET ORAL ONCE
Qty: 2 TABLET | Refills: 0 | Status: SHIPPED | OUTPATIENT
Start: 2024-05-07 | End: 2024-05-07

## 2024-05-07 RX ORDER — TRIAMCINOLONE ACETONIDE 1 MG/G
CREAM TOPICAL
COMMUNITY
Start: 2024-04-19

## 2024-05-24 ENCOUNTER — PATIENT OUTREACH (OUTPATIENT)
Dept: ADMINISTRATIVE | Facility: HOSPITAL | Age: 69
End: 2024-05-24
Payer: MEDICARE

## 2024-06-10 ENCOUNTER — PATIENT MESSAGE (OUTPATIENT)
Dept: INTERNAL MEDICINE | Facility: CLINIC | Age: 69
End: 2024-06-10
Payer: MEDICARE

## 2024-06-25 ENCOUNTER — PATIENT MESSAGE (OUTPATIENT)
Dept: UROLOGY | Facility: CLINIC | Age: 69
End: 2024-06-25
Payer: MEDICARE

## 2024-06-25 ENCOUNTER — PATIENT MESSAGE (OUTPATIENT)
Dept: INTERNAL MEDICINE | Facility: CLINIC | Age: 69
End: 2024-06-25
Payer: MEDICARE

## 2024-06-26 ENCOUNTER — PATIENT OUTREACH (OUTPATIENT)
Dept: ADMINISTRATIVE | Facility: HOSPITAL | Age: 69
End: 2024-06-26
Payer: MEDICARE

## 2024-07-11 ENCOUNTER — PATIENT MESSAGE (OUTPATIENT)
Dept: UROLOGY | Facility: CLINIC | Age: 69
End: 2024-07-11
Payer: MEDICARE

## 2024-07-17 ENCOUNTER — TELEPHONE (OUTPATIENT)
Dept: UROLOGY | Facility: CLINIC | Age: 69
End: 2024-07-17
Payer: MEDICARE

## 2024-07-17 DIAGNOSIS — R97.20 ELEVATED PSA: Primary | ICD-10-CM

## 2024-07-17 NOTE — TELEPHONE ENCOUNTER
Pelvic xray ordered      ----- Message from Iliana Carter RN sent at 7/17/2024 12:18 PM CDT -----  Regarding: FW: orders  Contact: @ [phone]    ----- Message -----  From: Jodie Gan  Sent: 7/17/2024  11:43 AM CDT  To: Sadiq Garza Staff  Subject: orders                                           Pt is requesting orders for the following a pelvic xray per the patient the radiologist told him he need this before his MRI which is on hold ...Please call and adv @ [phone]

## 2024-07-30 ENCOUNTER — PATIENT MESSAGE (OUTPATIENT)
Dept: UROLOGY | Facility: CLINIC | Age: 69
End: 2024-07-30
Payer: MEDICARE

## 2024-07-30 ENCOUNTER — HOSPITAL ENCOUNTER (OUTPATIENT)
Dept: RADIOLOGY | Facility: HOSPITAL | Age: 69
Discharge: HOME | End: 2024-07-30
Attending: UROLOGY
Payer: MEDICARE

## 2024-07-30 DIAGNOSIS — R97.20 ELEVATED PSA: ICD-10-CM

## 2024-07-30 PROCEDURE — 72170 X-RAY EXAM OF PELVIS: CPT | Mod: TC

## 2024-07-30 PROCEDURE — 72170 X-RAY EXAM OF PELVIS: CPT | Mod: 26,,, | Performed by: RADIOLOGY

## 2024-08-29 ENCOUNTER — PATIENT MESSAGE (OUTPATIENT)
Dept: INTERNAL MEDICINE | Facility: CLINIC | Age: 69
End: 2024-08-29
Payer: MEDICARE

## 2024-08-29 DIAGNOSIS — E78.5 HYPERLIPIDEMIA, UNSPECIFIED HYPERLIPIDEMIA TYPE: ICD-10-CM

## 2024-08-29 DIAGNOSIS — Z12.5 PROSTATE CANCER SCREENING: ICD-10-CM

## 2024-08-29 DIAGNOSIS — Z00.00 ANNUAL PHYSICAL EXAM: Primary | ICD-10-CM

## 2024-08-29 DIAGNOSIS — R53.83 FATIGUE, UNSPECIFIED TYPE: ICD-10-CM

## 2024-08-29 DIAGNOSIS — R73.9 HYPERGLYCEMIA: ICD-10-CM

## 2024-09-10 ENCOUNTER — LAB VISIT (OUTPATIENT)
Dept: LAB | Facility: HOSPITAL | Age: 69
End: 2024-09-10
Attending: INTERNAL MEDICINE
Payer: MEDICARE

## 2024-09-10 DIAGNOSIS — Z00.00 ANNUAL PHYSICAL EXAM: ICD-10-CM

## 2024-09-10 DIAGNOSIS — Z12.5 PROSTATE CANCER SCREENING: ICD-10-CM

## 2024-09-10 DIAGNOSIS — R73.9 HYPERGLYCEMIA: ICD-10-CM

## 2024-09-10 DIAGNOSIS — E78.5 HYPERLIPIDEMIA, UNSPECIFIED HYPERLIPIDEMIA TYPE: ICD-10-CM

## 2024-09-10 DIAGNOSIS — R53.83 FATIGUE, UNSPECIFIED TYPE: ICD-10-CM

## 2024-09-10 LAB
ALBUMIN SERPL BCP-MCNC: 4 G/DL (ref 3.5–5.2)
ALP SERPL-CCNC: 39 U/L (ref 55–135)
ALT SERPL W/O P-5'-P-CCNC: 10 U/L (ref 10–44)
ANION GAP SERPL CALC-SCNC: 9 MMOL/L (ref 8–16)
AST SERPL-CCNC: 19 U/L (ref 10–40)
BASOPHILS # BLD AUTO: 0.04 K/UL (ref 0–0.2)
BASOPHILS NFR BLD: 0.7 % (ref 0–1.9)
BILIRUB SERPL-MCNC: 1 MG/DL (ref 0.1–1)
BUN SERPL-MCNC: 20 MG/DL (ref 8–23)
CALCIUM SERPL-MCNC: 9.5 MG/DL (ref 8.7–10.5)
CHLORIDE SERPL-SCNC: 109 MMOL/L (ref 95–110)
CHOLEST SERPL-MCNC: 134 MG/DL (ref 120–199)
CHOLEST/HDLC SERPL: 3.1 {RATIO} (ref 2–5)
CO2 SERPL-SCNC: 24 MMOL/L (ref 23–29)
COMPLEXED PSA SERPL-MCNC: 2.6 NG/ML (ref 0–4)
CREAT SERPL-MCNC: 0.9 MG/DL (ref 0.5–1.4)
DIFFERENTIAL METHOD BLD: ABNORMAL
EOSINOPHIL # BLD AUTO: 0.1 K/UL (ref 0–0.5)
EOSINOPHIL NFR BLD: 2 % (ref 0–8)
ERYTHROCYTE [DISTWIDTH] IN BLOOD BY AUTOMATED COUNT: 12.8 % (ref 11.5–14.5)
EST. GFR  (NO RACE VARIABLE): >60 ML/MIN/1.73 M^2
ESTIMATED AVG GLUCOSE: 105 MG/DL (ref 68–131)
GLUCOSE SERPL-MCNC: 95 MG/DL (ref 70–110)
HBA1C MFR BLD: 5.3 % (ref 4–5.6)
HCT VFR BLD AUTO: 43.1 % (ref 40–54)
HDLC SERPL-MCNC: 43 MG/DL (ref 40–75)
HDLC SERPL: 32.1 % (ref 20–50)
HGB BLD-MCNC: 14.6 G/DL (ref 14–18)
IMM GRANULOCYTES # BLD AUTO: 0.01 K/UL (ref 0–0.04)
IMM GRANULOCYTES NFR BLD AUTO: 0.2 % (ref 0–0.5)
LDLC SERPL CALC-MCNC: 81.2 MG/DL (ref 63–159)
LYMPHOCYTES # BLD AUTO: 1.7 K/UL (ref 1–4.8)
LYMPHOCYTES NFR BLD: 31.4 % (ref 18–48)
MCH RBC QN AUTO: 29.9 PG (ref 27–31)
MCHC RBC AUTO-ENTMCNC: 33.9 G/DL (ref 32–36)
MCV RBC AUTO: 88 FL (ref 82–98)
MONOCYTES # BLD AUTO: 0.3 K/UL (ref 0.3–1)
MONOCYTES NFR BLD: 5 % (ref 4–15)
NEUTROPHILS # BLD AUTO: 3.3 K/UL (ref 1.8–7.7)
NEUTROPHILS NFR BLD: 60.7 % (ref 38–73)
NONHDLC SERPL-MCNC: 91 MG/DL
NRBC BLD-RTO: 0 /100 WBC
PLATELET # BLD AUTO: 143 K/UL (ref 150–450)
PMV BLD AUTO: 11.1 FL (ref 9.2–12.9)
POTASSIUM SERPL-SCNC: 4.1 MMOL/L (ref 3.5–5.1)
PROT SERPL-MCNC: 6.7 G/DL (ref 6–8.4)
RBC # BLD AUTO: 4.88 M/UL (ref 4.6–6.2)
SODIUM SERPL-SCNC: 142 MMOL/L (ref 136–145)
TRIGL SERPL-MCNC: 49 MG/DL (ref 30–150)
TSH SERPL DL<=0.005 MIU/L-ACNC: 1.14 UIU/ML (ref 0.4–4)
WBC # BLD AUTO: 5.38 K/UL (ref 3.9–12.7)

## 2024-09-10 PROCEDURE — 83036 HEMOGLOBIN GLYCOSYLATED A1C: CPT | Performed by: INTERNAL MEDICINE

## 2024-09-10 PROCEDURE — 84153 ASSAY OF PSA TOTAL: CPT | Performed by: INTERNAL MEDICINE

## 2024-09-10 PROCEDURE — 80061 LIPID PANEL: CPT | Performed by: INTERNAL MEDICINE

## 2024-09-10 PROCEDURE — 84443 ASSAY THYROID STIM HORMONE: CPT | Performed by: INTERNAL MEDICINE

## 2024-09-10 PROCEDURE — 85025 COMPLETE CBC W/AUTO DIFF WBC: CPT | Performed by: INTERNAL MEDICINE

## 2024-09-10 PROCEDURE — 80053 COMPREHEN METABOLIC PANEL: CPT | Performed by: INTERNAL MEDICINE

## 2024-09-10 PROCEDURE — 36415 COLL VENOUS BLD VENIPUNCTURE: CPT | Performed by: INTERNAL MEDICINE

## 2024-09-16 ENCOUNTER — TELEPHONE (OUTPATIENT)
Dept: UROLOGY | Facility: CLINIC | Age: 69
End: 2024-09-16
Payer: MEDICARE

## 2024-09-16 DIAGNOSIS — R97.20 ELEVATED PSA: Primary | ICD-10-CM

## 2024-09-24 ENCOUNTER — OFFICE VISIT (OUTPATIENT)
Dept: INTERNAL MEDICINE | Facility: CLINIC | Age: 69
End: 2024-09-24
Payer: MEDICARE

## 2024-09-24 VITALS
SYSTOLIC BLOOD PRESSURE: 106 MMHG | WEIGHT: 179.88 LBS | HEART RATE: 61 BPM | OXYGEN SATURATION: 98 % | DIASTOLIC BLOOD PRESSURE: 70 MMHG | BODY MASS INDEX: 23.84 KG/M2 | HEIGHT: 73 IN

## 2024-09-24 DIAGNOSIS — E78.5 HYPERLIPIDEMIA, UNSPECIFIED HYPERLIPIDEMIA TYPE: ICD-10-CM

## 2024-09-24 DIAGNOSIS — Z00.00 ANNUAL PHYSICAL EXAM: Primary | ICD-10-CM

## 2024-09-24 DIAGNOSIS — D69.6 THROMBOCYTOPENIA, UNSPECIFIED: ICD-10-CM

## 2024-09-24 DIAGNOSIS — N40.1 BENIGN PROSTATIC HYPERPLASIA WITH LOWER URINARY TRACT SYMPTOMS, SYMPTOM DETAILS UNSPECIFIED: ICD-10-CM

## 2024-09-24 PROBLEM — I70.0 ATHEROSCLEROSIS OF AORTA: Status: RESOLVED | Noted: 2024-09-24 | Resolved: 2024-09-24

## 2024-09-24 PROBLEM — I70.0 ATHEROSCLEROSIS OF AORTA: Status: ACTIVE | Noted: 2024-09-24

## 2024-09-24 PROCEDURE — 1159F MED LIST DOCD IN RCRD: CPT | Mod: CPTII,S$GLB,, | Performed by: INTERNAL MEDICINE

## 2024-09-24 PROCEDURE — 3078F DIAST BP <80 MM HG: CPT | Mod: CPTII,S$GLB,, | Performed by: INTERNAL MEDICINE

## 2024-09-24 PROCEDURE — 99999 PR PBB SHADOW E&M-EST. PATIENT-LVL III: CPT | Mod: PBBFAC,,, | Performed by: INTERNAL MEDICINE

## 2024-09-24 PROCEDURE — 99397 PER PM REEVAL EST PAT 65+ YR: CPT | Mod: S$GLB,,, | Performed by: INTERNAL MEDICINE

## 2024-09-24 PROCEDURE — 3044F HG A1C LEVEL LT 7.0%: CPT | Mod: CPTII,S$GLB,, | Performed by: INTERNAL MEDICINE

## 2024-09-24 PROCEDURE — 3074F SYST BP LT 130 MM HG: CPT | Mod: CPTII,S$GLB,, | Performed by: INTERNAL MEDICINE

## 2024-09-24 PROCEDURE — 1101F PT FALLS ASSESS-DOCD LE1/YR: CPT | Mod: CPTII,S$GLB,, | Performed by: INTERNAL MEDICINE

## 2024-09-24 PROCEDURE — 3288F FALL RISK ASSESSMENT DOCD: CPT | Mod: CPTII,S$GLB,, | Performed by: INTERNAL MEDICINE

## 2024-09-24 PROCEDURE — 3008F BODY MASS INDEX DOCD: CPT | Mod: CPTII,S$GLB,, | Performed by: INTERNAL MEDICINE

## 2024-09-24 PROCEDURE — 1126F AMNT PAIN NOTED NONE PRSNT: CPT | Mod: CPTII,S$GLB,, | Performed by: INTERNAL MEDICINE

## 2024-09-24 NOTE — PROGRESS NOTES
"Subjective:       Patient ID: Fausto Aldana is a 69 y.o. male.    Chief Complaint: Annual Exam  This is a 69-year-old who presents today for physical.  Patient reports that he has been doing well overall he continues to work at [employer] as a  and stays active he denies chest pain or shortness a breath recently he reports that was having concerns about [facility] he is not sure that it was his but repeat levels did show improvement and he did see Urology for evaluation he has been taking his Flomax which has helped him with his urinary symptoms and he reports they were going to do an MRI but he decided against it when he had normal PSA and also some previous caps that he has from prior vasectomy at this point he has not had any problems with pain he will plan to continue to see urology again in 6 months after his last test.  His cut back on eggs and eating a bit healthier since his cholesterol was slightly up at last visit he does take aspirin a day due to his family history.  He had labs prior to the appointment he will plan on getting his flu shot and then the COVID vaccine at [employer]  Has been trying to hydrate more and no syncopal episodes    HPI  Review of Systems   Respiratory:  Negative for shortness of breath.    Cardiovascular:  Negative for chest pain.   Genitourinary:         Urinarion improved with flomax    Musculoskeletal:         Knee pain improved       Objective:    Blood pressure 106/70, pulse 61, height 6' 1" (1.854 m), weight 81.6 kg (179 lb 14.3 oz), SpO2 98%.   Physical Exam  Constitutional:       General: He is not in acute distress.  HENT:      Head: Normocephalic.      Mouth/Throat:      Pharynx: Oropharynx is clear.   Cardiovascular:      Rate and Rhythm: Normal rate and regular rhythm.      Heart sounds: Normal heart sounds. No murmur heard.     No friction rub. No gallop.   Pulmonary:      Effort: Pulmonary effort is normal. No respiratory distress. "      Breath sounds: Normal breath sounds.   Abdominal:      General: Bowel sounds are normal.      Palpations: Abdomen is soft. There is no mass.      Tenderness: There is no abdominal tenderness.   Genitourinary:     Comments: No hernias  Prior surgical caps vastectomy non tender  Skin:     Findings: No erythema.   Neurological:      Mental Status: He is alert.   Psychiatric:         Mood and Affect: Mood normal.         Assessment:       1. Annual physical exam    2. Hyperlipidemia, unspecified hyperlipidemia type    3. Benign prostatic hyperplasia with lower urinary tract symptoms, symptom details unspecified    4. Thrombocytopenia, unspecified        Plan:       Fausto was seen today for annual exam.    Diagnoses and all orders for this visit:    Annual physical exam    Hyperlipidemia, unspecified hyperlipidemia type  He is tolerating atorvastatin will continue improvement in his lipids    Benign prostatic hyperplasia with lower urinary tract symptoms, symptom details unspecified  Tolerating Flomax is recent PSA was back to baseline he decided to not proceed with MRI due to his concerns and the fact that his levels normalized but he does have a follow-up to continue surveillance with Urology    Thrombocytopenia, unspecified  Discussed fairly stable he does take aspirin daily    Flu shot and COVID vaccines discussed he plans to get in October    Follow-up annually sooner if concern

## 2024-12-29 ENCOUNTER — NURSE TRIAGE (OUTPATIENT)
Dept: ADMINISTRATIVE | Facility: CLINIC | Age: 69
End: 2024-12-29
Payer: MEDICARE

## 2024-12-29 NOTE — TELEPHONE ENCOUNTER
Patient is calling, states he has a sinus infection and would like a message sent to Dr. Flynn to be seen tomorrow for a refill. Advised of triage process. Triage done- dispo home care, however will send message to be seen in am. Would like reply via MY CHART. Reason for Disposition   [1] Sinus congestion as part of a cold AND [2] present < 10 days    Additional Information   Negative: SEVERE difficulty breathing (e.g., struggling for each breath, speaks in single words)   Negative: Sounds like a life-threatening emergency to the triager   Negative: [1] Difficulty breathing AND [2] not from stuffy nose (e.g., not relieved by cleaning out the nose)   Negative: [1] SEVERE headache AND [2] fever   Negative: [1] Redness or swelling on the cheek, forehead or around the eye AND [2] fever   Negative: Fever > 104 F (40 C)   Negative: Patient sounds very sick or weak to the triager   Negative: [1] SEVERE pain AND [2] not improved 2 hours after pain medicine   Negative: [1] Redness or swelling on the cheek, forehead or around the eye AND [2] no fever   Negative: [1] Fever > 101 F (38.3 C) AND [2] age > 60 years   Negative: [1] Fever > 100 F (37.8 C) AND [2] bedridden (e.g., CVA, chronic illness, recovering from surgery)   Negative: [1] Fever > 100 F (37.8 C) AND [2] diabetes mellitus or weak immune system (e.g., HIV positive, cancer chemo, splenectomy, organ transplant, chronic steroids)   Negative: Fever present > 3 days (72 hours)   Negative: [1] Fever returns after gone for over 24 hours AND [2] symptoms worse or not improved   Negative: [1] Sinus pain (not just congestion) AND [2] fever   Negative: Earache   Negative: [1] Sinus congestion (pressure, fullness) AND [2] present > 10 days   Negative: [1] Nasal discharge AND [2] present > 10 days   Negative: [1] Sinus infection AND [2] taking an antibiotic AND [3] symptoms continue   Negative: [1] Using nasal washes and pain medicine > 24 hours AND [2] sinus pain (around  cheekbone or eye) persists   Negative: Lots of coughing    Protocols used: Sinus Pain or Congestion-A-AH

## 2025-01-22 ENCOUNTER — TELEPHONE (OUTPATIENT)
Dept: PHARMACY | Facility: CLINIC | Age: 70
End: 2025-01-22
Payer: MEDICARE

## 2025-01-22 NOTE — TELEPHONE ENCOUNTER
[facility]/Population Health Chart Review & Patient Outreach Details For Medication Adherence Project    Reason for Outreach Encounter: 3rd Party payor non-compliance report ([employer], [employer], C, etc)  2.  Patient Outreach Method: Reviewed patient chart   3.   Medication in question:    Hyperlipidemia Medications               atorvastatin (LIPITOR) 40 MG tablet Take 1 tablet (40 mg total) by mouth once daily.                  atorvastatin  last filled  12/10/24 for 90 day supply    4.  Reviewed and or Updates Made To: Patient Chart  5. Outreach Outcomes and/or actions taken: Patient filled medication and is on track to be adherent  Additional Notes:

## 2025-01-30 DIAGNOSIS — Z00.00 ENCOUNTER FOR MEDICARE ANNUAL WELLNESS EXAM: ICD-10-CM

## 2025-02-21 ENCOUNTER — PATIENT MESSAGE (OUTPATIENT)
Dept: INTERNAL MEDICINE | Facility: CLINIC | Age: 70
End: 2025-02-21
Payer: MEDICARE

## 2025-03-14 ENCOUNTER — LAB VISIT (OUTPATIENT)
Dept: LAB | Facility: HOSPITAL | Age: 70
End: 2025-03-14
Attending: UROLOGY
Payer: MEDICARE

## 2025-03-14 ENCOUNTER — RESULTS FOLLOW-UP (OUTPATIENT)
Dept: UROLOGY | Facility: HOSPITAL | Age: 70
End: 2025-03-14

## 2025-03-14 DIAGNOSIS — R97.20 ELEVATED PSA: ICD-10-CM

## 2025-03-14 LAB — COMPLEXED PSA SERPL-MCNC: 2.8 NG/ML (ref 0–4)

## 2025-03-14 PROCEDURE — 36415 COLL VENOUS BLD VENIPUNCTURE: CPT | Performed by: UROLOGY

## 2025-03-14 PROCEDURE — 84153 ASSAY OF PSA TOTAL: CPT | Performed by: UROLOGY

## 2025-04-21 ENCOUNTER — PATIENT MESSAGE (OUTPATIENT)
Dept: ADMINISTRATIVE | Facility: HOSPITAL | Age: 70
End: 2025-04-21
Payer: MEDICARE

## 2025-05-16 ENCOUNTER — PATIENT MESSAGE (OUTPATIENT)
Dept: INTERNAL MEDICINE | Facility: CLINIC | Age: 70
End: 2025-05-16
Payer: MEDICARE

## 2025-05-20 ENCOUNTER — PATIENT OUTREACH (OUTPATIENT)
Dept: ADMINISTRATIVE | Facility: HOSPITAL | Age: 70
End: 2025-05-20
Payer: MEDICARE

## 2025-05-20 NOTE — LETTER
AUTHORIZATION FOR RELEASE OF   CONFIDENTIAL INFORMATION    Dear [facility],    We are seeing Fausto Aldana, date of birth 1955, in the clinic at [facility]. Kay Flynn MD is the patient's PCP. Fausto Aldana has an outstanding lab/procedure at the time we reviewed his chart. In order to help keep his health information updated, he has authorized us to request the following medical record(s):        (  )  MAMMOGRAM                                      ( xx )  COLONOSCOPY      (  )  PAP SMEAR                                          (  )  OUTSIDE LAB RESULTS     (  )  DEXA SCAN                                          (  )  EYE EXAM            (  )  FOOT EXAM                                          (  )  ENTIRE RECORD     (  )  OUTSIDE IMMUNIZATIONS                 ( xx )  pathology/fit kit         Please fax records to Kay Flynn MD,  at [phone] or email to [email or web address].       If you have any questions, please contact JAMAR Harden at [phone].           Patient Name: Fausto Aldana  : 1955  Patient Phone #: [phone]                            Fausto Aldana  MRN: [id number]  : 1955  Age: 68 y.o.  Sex: male         Patient/Legal Guardian Signature  This signature was collected at 2023    Fausto Aldana       _______________________________   Printed Name/Relationship to Patient      Consent for Examination and Treatment: I hereby authorize the providers and employees of [facility] ([facility]) to provide medical treatment/services which includes, but is not limited to, performing and administering tests and diagnostic procedures that are deemed necessary, including, but not limited to, imaging examinations, blood tests and other laboratory procedures as may be required by the hospital, clinic, or may be ordered by my physician(s) or persons working under the general and/or  special instructions of my physician(s).      I understand and agree that this consent covers all authorized persons, including but not limited to physicians, residents, nurse practitioners, physicians' assistants, specialists, consultants, student nurses, and independently contracted physicians, who are called upon by the physician in charge, to carry out the diagnostic procedures and medical or surgical treatment.     I hereby authorize [facility] to retain or dispose of any specimens or tissue, should there be such remaining from any test or procedure.     I hereby authorize and give consent for [facility] providers and employees to take photographs, images or videotapes of such diagnostic, surgical or treatment procedures of Patient as may be required by [facility] or as may be ordered by a physician. I further acknowledge and agree that [facility] may use cameras or other devices for patient monitoring.     I am aware that the practice of medicine is not an exact science, and I acknowledge that no guarantees have been made to me as to the outcome of any tests, procedures or treatment.     Authorization for Release of Information: I understand that my insurance company and/or their agents may need information necessary to make determinations about payment/reimbursement. I hereby provide authorization to release to all insurance companies, their successors, assignees, other parties with whom they may have contracted, or others acting on their behalf, that are involved with payment for any hospital and/or clinic charges incurred by the patient, any information that they request and deem necessary for payment/reimbursement, and/or quality review.  I further authorize the release of my health information to physicians or other health care practitioners on staff who are involved in my health care now and in the future, and to other health care providers, entities, or institutions for the purpose of my continued care and  treatment, including referrals.     REGISTRATION AUTHORIZATION  Form No. 79263 (Rev. 7/13/2022)       Medicare Patient's Certification and Authorization to Release Information and Payment Request:  I certify that the information given by me in applying for payment under Title XVIII of the Social Security Act is correct. I authorize any horta of medical or other information about me to release to the [facility], or its intermediaries or carriers, any information needed for this or a related Medicare claim. I request that payment of authorized benefits be made on my behalf.     Assignment of Insurance Benefits:   I hereby authorize any and all insurance companies, health plans, defined   benefit plans, health insurers or any entity that is or may be responsible for payment of my medical expenses to pay all hospital and medical benefits now due, and to become due and payable to me under any hospital benefits, sick benefits, injury benefits or any other benefit for services rendered to me, including Major Medical Benefits, direct to [facility] and all independently contracted physicians. I assign any and all rights that I may have against any and all insurance companies, health plans, defined benefit plans, health insurers or any entity that is or may be responsible for payment of my medical expenses, including, but not limited to any right to appeal a denial of a claim, any right to bring any action, lawsuit, administrative proceeding, or other cause of action on my behalf. I specifically assign my right to pursue litigation against any and all insurance companies, health plans, defined benefit plans, health insurers or any entity that is or may be responsible for payment of my medical expenses based upon a refusal to pay charges.            E. Valuables: It is understood and agreed that [facility] is not liable for the damage to or loss of any money, jewelry,   documents, dentures, eye glasses, hearing  aids, prosthetics, or other property of value.     F. Computer Equipment: I understand and agree that should I choose to use computer equipment owned by [facility] or if I choose to access the Internet via [facility]s network, I do so at my own risk. [facility] is not responsible for any damage to my computer equipment or to any damages of any type that might arise from my loss of equipment or data.     G. Acceptance of Financial Responsibility:  I agree that in consideration of the services and   supplies that have been   or will be furnished to the patient, I am hereby obligated to pay all charges made for or on the account of the patient according to the standard rates (in effect at the time the services and supplies are delivered) established by [facility], including its Patient Financial Assistance Policy to the extent it is applicable. I understand that I am responsible for all charges, or portions thereof, not covered by insurance or other sources. Patient refunds will be distributed only after balances at all [facility] facilities are paid.     H. Communication Authorization:  I hereby authorize [facility] and its representatives, along with any billing service   or  who may work on their behalf, to contact me on   my cell phone and/or home phone using pre- recorded messages, artificial voice messages, automatic telephone dialing devices or other computer assisted technology, or by electronic      mail, text messaging, or by any other form of electronic communication. This includes, but is not limited to, appointment reminders, yearly physical exam reminders, preventive care reminders, patient campaigns, welcome calls, and calls about account balances on my account or any account on which I am listed as a guarantor. I understand I have the right to opt out of these communications at any time.      Relationship  Between  Facility and  Provider:      I understand that some, but not all, providers furnishing  services to the patient are not employees or agents of [facility]. The patient is under the care and supervision of his/her attending physician, and it is the responsibility of the facility and its nursing staff to carry out the instructions of such physicians. It is the responsibility of the patient's physician/designee to obtain the patient's informed consent, when required, for medical or surgical treatment, special diagnostic or therapeutic procedures, or hospital services rendered for the patient under the special instructions of the physician/designee.     REGISTRATION AUTHORIZATION  Form No. 38532 (Rev. 7/13/2022)      Notice of Privacy Practices: I acknowledge I have received a copy of [facility]'s Notice of Privacy Practices.     Facility  Directory: I have discussed with the organization my desire to be either included or excluded  in the facility directory in the event of my being an inpatient at an [facility] facility. I understand that if my choice is to opt-out of being identified in the facility directory that the facility will not provide any information about me such as my condition (e.g. fair, stable, etc.) or my location in the facility (e.g., room number, department).     Immunizations: [facility] shares immunization information with state sponsored health departments to help you and your doctor keep track of your immunization records. By signing, you consent to have this information shared with the health department in your state:                                [place] - LINKS ([place] Immunization Network for Kids Statewide)                                [place] - MIIX ([place] Immunization Information eXchange)                                [place] - ImmPRINT (Immunization Patient Registry with Integrated Technology)     TERM: This authorization is valid for this and subsequent care/treatment I receive at [facility] and will remain valid unless/until revoked in writing by me.     [facility]: As used in this document, [facility] means all [facility] owned and managed facilities, including, but not limited to, all health centers, surgery centers, clinics, urgent care centers, and hospitals.         [facility] complies with applicable Federal civil rights laws and does not discriminate on the basis of race, color, national origin, age, disability, or sex.  ATENCIÓN: si habla español, tiene a islas disposición servicios gratuitos de asistencia lingüística. [place] simmons [phone].  CHÚ Ý: N?u b?n nói Ti?ng Vi?t, có các d?ch v? h? tr? ngôn ng? mi?n phí dành cho b?n. G?i s? [phone].        REGISTRATION AUTHORIZATION  Form No. 78830 (Rev. 7/13/2022)

## 2025-05-20 NOTE — PROGRESS NOTES
Chart reviewed and updated. Reconciled immunizations, health maintenance and care everywhere.  [facility] and [facility].      Fina Nix [facility]  Panel Care Coordinator  [facility]  [phone]  For Kay Flynn MD

## 2025-06-04 ENCOUNTER — PATIENT MESSAGE (OUTPATIENT)
Dept: INTERNAL MEDICINE | Facility: CLINIC | Age: 70
End: 2025-06-04
Payer: MEDICARE

## 2025-06-04 DIAGNOSIS — E78.5 HYPERLIPIDEMIA, UNSPECIFIED HYPERLIPIDEMIA TYPE: ICD-10-CM

## 2025-06-04 RX ORDER — ATORVASTATIN CALCIUM 40 MG/1
40 TABLET, FILM COATED ORAL DAILY
Qty: 90 TABLET | Refills: 4 | Status: SHIPPED | OUTPATIENT
Start: 2025-06-04

## 2025-06-04 RX ORDER — TAMSULOSIN HYDROCHLORIDE 0.4 MG/1
0.4 CAPSULE ORAL DAILY
Qty: 90 CAPSULE | Refills: 3 | Status: SHIPPED | OUTPATIENT
Start: 2025-06-04 | End: 2026-06-04